# Patient Record
Sex: FEMALE | Race: WHITE | Employment: UNEMPLOYED | URBAN - METROPOLITAN AREA
[De-identification: names, ages, dates, MRNs, and addresses within clinical notes are randomized per-mention and may not be internally consistent; named-entity substitution may affect disease eponyms.]

---

## 2017-01-17 RX ORDER — LEVOTHYROXINE SODIUM 0.1 MG/1
TABLET ORAL
Qty: 30 TABLET | Refills: 2 | Status: SHIPPED | OUTPATIENT
Start: 2017-01-17 | End: 2017-04-10

## 2017-01-17 NOTE — TELEPHONE ENCOUNTER
Pt requests refill on Levothyroxine, protocol passed, refill approved    LOV 12/9/16    LF 10/20/16 #30 w/2    No future appointments.

## 2017-04-10 RX ORDER — LEVOTHYROXINE SODIUM 0.1 MG/1
TABLET ORAL
Qty: 30 TABLET | Refills: 2 | Status: SHIPPED | OUTPATIENT
Start: 2017-04-10 | End: 2017-09-26

## 2017-04-10 NOTE — TELEPHONE ENCOUNTER
Pt requesting refill on Levothyroxine, protocol passed, refill approved    LOV 12/9/16    LF 1/17/17 #30 w/2

## 2017-05-08 ENCOUNTER — OFFICE VISIT (OUTPATIENT)
Dept: FAMILY MEDICINE CLINIC | Facility: CLINIC | Age: 52
End: 2017-05-08

## 2017-05-08 DIAGNOSIS — Z11.1 SCREENING FOR TUBERCULOSIS: Primary | ICD-10-CM

## 2017-05-08 PROCEDURE — 86580 TB INTRADERMAL TEST: CPT | Performed by: FAMILY MEDICINE

## 2017-05-08 NOTE — PROGRESS NOTES
Pt here for first dose of 2-step tb. Discussed 2-step procedure. Pt has no forms to complete, but her boss stated that she should have both doses read after 48-72 hours.   Confirmed that pt is available to come back in 48-72 hours and then again next wee

## 2017-05-08 NOTE — PATIENT INSTRUCTIONS
You will need to return to clinic in 48-72 hours to have results of TB test read. Please return to clinic on 5/10 after 4:45pm or on 5/11 before 4:45pm to have your TB test read.     If you do not return during this time your test will need to be repeat

## 2017-05-11 ENCOUNTER — NURSE ONLY (OUTPATIENT)
Dept: FAMILY MEDICINE CLINIC | Facility: CLINIC | Age: 52
End: 2017-05-11

## 2017-05-15 ENCOUNTER — OFFICE VISIT (OUTPATIENT)
Dept: FAMILY MEDICINE CLINIC | Facility: CLINIC | Age: 52
End: 2017-05-15

## 2017-05-15 DIAGNOSIS — Z11.1 SCREENING FOR TUBERCULOSIS: Primary | ICD-10-CM

## 2017-05-15 PROCEDURE — 86580 TB INTRADERMAL TEST: CPT | Performed by: PHYSICIAN ASSISTANT

## 2017-05-15 NOTE — PROGRESS NOTES
Bertha Alberts 47year old female       [place] 2    · Live vaccines in the past month? no  · Any steroid medication in the past month? no  · History of BCG vaccine? no  · If female, are you currently pregnant?   no    · Are

## 2017-05-15 NOTE — PATIENT INSTRUCTIONS
You will need to return to clinic in 48-72 hours to have results of TB test read. Please return to clinic on 5/17/17 between 10:30 am and 7:30 pm or on 5/18/17 between 8:00 am and 10:30 am to have your TB test read.         TB Screening (Skin)   Does [facility] These tests are to find out if you have active or latent TB. A blood screening test is also available for TB, but only one screening test will be recommended by your healthcare provider, based on your case. What do my test results mean?   Many things may a infection.   You might have a false negative test if you are:  · Malnourished  · On steroid therapy  · Taking medicines that can affect your immune system, such as medicine for AIDS or cancer  How do I get ready for this test?  You don't need to prepare for

## 2017-05-17 ENCOUNTER — OFFICE VISIT (OUTPATIENT)
Dept: FAMILY MEDICINE CLINIC | Facility: CLINIC | Age: 52
End: 2017-05-17

## 2017-05-17 DIAGNOSIS — Z11.1 ENCOUNTER FOR PPD SKIN TEST READING: Primary | ICD-10-CM

## 2017-05-17 NOTE — PROGRESS NOTES
Patient presents for PPD read for 2nd step PPD placement. No problems and feeling well.  0mm induration. Documentation provided to patient.   Follow up PRN

## 2017-09-26 ENCOUNTER — OFFICE VISIT (OUTPATIENT)
Dept: FAMILY MEDICINE CLINIC | Facility: CLINIC | Age: 52
End: 2017-09-26

## 2017-09-26 ENCOUNTER — APPOINTMENT (OUTPATIENT)
Dept: LAB | Age: 52
End: 2017-09-26
Attending: FAMILY MEDICINE
Payer: COMMERCIAL

## 2017-09-26 VITALS
HEART RATE: 77 BPM | DIASTOLIC BLOOD PRESSURE: 70 MMHG | TEMPERATURE: 98 F | OXYGEN SATURATION: 98 % | SYSTOLIC BLOOD PRESSURE: 98 MMHG | BODY MASS INDEX: 32 KG/M2 | RESPIRATION RATE: 18 BRPM | WEIGHT: 167.38 LBS

## 2017-09-26 DIAGNOSIS — E03.9 ACQUIRED HYPOTHYROIDISM: ICD-10-CM

## 2017-09-26 DIAGNOSIS — J45.31 MILD PERSISTENT ASTHMA WITH ACUTE EXACERBATION: ICD-10-CM

## 2017-09-26 DIAGNOSIS — Z12.39 SCREENING FOR BREAST CANCER: ICD-10-CM

## 2017-09-26 DIAGNOSIS — E03.9 ACQUIRED HYPOTHYROIDISM: Primary | ICD-10-CM

## 2017-09-26 DIAGNOSIS — K29.30 CHRONIC SUPERFICIAL GASTRITIS WITHOUT BLEEDING: ICD-10-CM

## 2017-09-26 LAB
FREE T4: 1 NG/DL (ref 0.9–1.8)
TSI SER-ACNC: 2.62 MIU/ML (ref 0.35–5.5)

## 2017-09-26 PROCEDURE — 84443 ASSAY THYROID STIM HORMONE: CPT | Performed by: FAMILY MEDICINE

## 2017-09-26 PROCEDURE — 84439 ASSAY OF FREE THYROXINE: CPT | Performed by: FAMILY MEDICINE

## 2017-09-26 PROCEDURE — 90686 IIV4 VACC NO PRSV 0.5 ML IM: CPT | Performed by: FAMILY MEDICINE

## 2017-09-26 PROCEDURE — 90471 IMMUNIZATION ADMIN: CPT | Performed by: FAMILY MEDICINE

## 2017-09-26 PROCEDURE — 36415 COLL VENOUS BLD VENIPUNCTURE: CPT | Performed by: FAMILY MEDICINE

## 2017-09-26 PROCEDURE — 99214 OFFICE O/P EST MOD 30 MIN: CPT | Performed by: FAMILY MEDICINE

## 2017-09-26 RX ORDER — OMEPRAZOLE 40 MG/1
40 CAPSULE, DELAYED RELEASE ORAL
Qty: 90 CAPSULE | Refills: 3 | Status: SHIPPED | OUTPATIENT
Start: 2017-09-26 | End: 2018-10-11

## 2017-09-26 RX ORDER — PREDNISONE 20 MG/1
TABLET ORAL
Qty: 30 TABLET | Refills: 0 | Status: SHIPPED | OUTPATIENT
Start: 2017-09-26 | End: 2018-02-13 | Stop reason: ALTCHOICE

## 2017-09-26 RX ORDER — ALBUTEROL SULFATE 90 UG/1
2 AEROSOL, METERED RESPIRATORY (INHALATION) EVERY 4 HOURS PRN
Qty: 1 INHALER | Refills: 1 | Status: SHIPPED | OUTPATIENT
Start: 2017-09-26 | End: 2018-06-14

## 2017-09-26 RX ORDER — LEVOTHYROXINE SODIUM 0.1 MG/1
TABLET ORAL
Qty: 30 TABLET | Refills: 12 | Status: SHIPPED | OUTPATIENT
Start: 2017-09-26 | End: 2018-10-05

## 2017-09-26 RX ORDER — MONTELUKAST SODIUM 10 MG/1
10 TABLET ORAL NIGHTLY
Qty: 90 TABLET | Refills: 4 | Status: SHIPPED | OUTPATIENT
Start: 2017-09-26 | End: 2018-11-29

## 2017-09-26 NOTE — PATIENT INSTRUCTIONS
Start taper tomorrow after finish initial prednisone dose start  prednisone 20 mg 2 tablets daily x 5days then taper as directed  Do not take any Ibuprofen, Advil, Motrin, Naproxen, Aspirin while on Prednisone or medrol dose pack.   Tylenol(Acetaminophen) i The air you inhale contains oxygen. When oxygen reaches the air sacs, it passes into the blood vessels surrounding the sacs. Your blood then delivers oxygen to all of your cells. Carbon dioxide is removed from the body in a similar way, as you exhale.   Whe Call 911, or have someone call for you, if you have any of these symptoms and they are not relieved right away by taking your quick-relief medication as prescribed:  · Severe difficulty breathing  · Being too short of breath to talk or walk  · Lips or fing · Make sure you check the package insert for special instructions. The insert is the information that comes with the medicine. It may tell you how to take care of and clean your spacer.   When to replace your inhaler  Each inhaler is good for only a certain

## 2017-09-27 PROBLEM — J45.30 MILD PERSISTENT ASTHMA WITHOUT COMPLICATION (HCC): Status: ACTIVE | Noted: 2017-09-27

## 2017-09-27 PROBLEM — J45.30 MILD PERSISTENT ASTHMA WITHOUT COMPLICATION: Status: ACTIVE | Noted: 2017-09-27

## 2017-09-27 NOTE — PROGRESS NOTES
CHIEF COMPLAINT: Patient presents with:  Medication Request        HPI:     Cayla Espinoza is a 47year old female presents for evaluation of asthma flare. States 3-4 weeks ago started having nasal congestion runny nose and taking allergy meds.   Symptoms Smokeless tobacco: Never Used                      Alcohol use: Yes           0.0 oz/week     Comment: social       Medications (Active prior to today's visit):    Current Outpatient Prescriptions:  O wnl maria t. NO teeth clenching. bluish nasal turbinates b/l. Nasal congestion. Sinuses nontender. NECK: supple, no lymphadenopathy, no thyroid nodule, smooth gland, no thyroid enlargement. LUNGS: CTA maria t. Rales rhonchi wheezing or wheezes.   Good inspirator Dispense: 90 tablet; Refill: 4    3. Acquired hypothyroidism  Euthymic. Taking same dose for several years. - Levothyroxine Sodium 100 MCG Oral Tab; TAKE ONE TABLET BY MOUTH EVERY DAY BEFORE BREAKFAST  Dispense: 30 tablet;  Refill: 12  - TSH+FREE T4; Futu result of today.      Problem List:   Patient Active Problem List:     Asthma     GERD (gastroesophageal reflux disease)     Family history of esophageal cancer     Family history of colon cancer     Hypothyroidism     Allergic rhinitis     Hypothyroid

## 2017-09-29 RX ORDER — AZELASTINE HYDROCHLORIDE 0.5 MG/ML
1 SOLUTION/ DROPS OPHTHALMIC 2 TIMES DAILY
Qty: 6 ML | Refills: 6 | Status: SHIPPED | OUTPATIENT
Start: 2017-09-29 | End: 2019-10-16

## 2017-09-29 NOTE — TELEPHONE ENCOUNTER
Reviewed normal lab results with patient, patient states at [address] it was discussed that she was to start eye drops for her allergies, she had mentioned the pharmacist gave our office some options?  Please advise on what you'd like to prescribe or I can call ph

## 2017-09-29 NOTE — TELEPHONE ENCOUNTER
Pharmacist states you can try a generic azelastine. Most eye drops are not covered by insurance. Pended azelastine 0.05% eye drop which has generic options.

## 2017-10-17 ENCOUNTER — OFFICE VISIT (OUTPATIENT)
Dept: FAMILY MEDICINE CLINIC | Facility: CLINIC | Age: 52
End: 2017-10-17

## 2017-10-17 VITALS
HEART RATE: 68 BPM | RESPIRATION RATE: 18 BRPM | DIASTOLIC BLOOD PRESSURE: 70 MMHG | OXYGEN SATURATION: 99 % | TEMPERATURE: 99 F | SYSTOLIC BLOOD PRESSURE: 102 MMHG | BODY MASS INDEX: 31 KG/M2 | WEIGHT: 166.63 LBS

## 2017-10-17 DIAGNOSIS — J30.2 CHRONIC SEASONAL ALLERGIC RHINITIS DUE TO OTHER ALLERGEN: ICD-10-CM

## 2017-10-17 DIAGNOSIS — J45.30 MILD PERSISTENT ASTHMA WITHOUT COMPLICATION: Primary | ICD-10-CM

## 2017-10-17 PROCEDURE — 99213 OFFICE O/P EST LOW 20 MIN: CPT | Performed by: FAMILY MEDICINE

## 2017-10-17 NOTE — PROGRESS NOTES
CHIEF COMPLAINT: Patient presents with: Follow - Up: asthma        HPI:     Izabela Steen is a 47year old female presents for follow-up asthma evaluation. On last week of prednisone steroid taper and denies any symptoms.   Denies cough, wheezing, chest Prescriptions:  Azelastine HCl 0.05 % Ophthalmic Solution Place 1 drop into both eyes 2 (two) times daily. Disp: 6 mL Rfl: 6   Omeprazole 40 MG Oral Capsule Delayed Release Take 1 capsule (40 mg total) by mouth once daily.  Disp: 90 capsule Rfl: 3   Levothy supple, no lymphadenopathy, no thyroid nodule, smooth gland, no thyroid enlargement. LUNGS: CTA maria t. Without Rales rhonchi wheezing or wheezes. Good inspiratory expiratory asked effort. HEART:S1/S2 reg., no murmurs, clicks, gallops.   ABD: soft, BS pres This Visit:    No prescriptions requested or ordered in this encounter    Health Maintenance:  Annual Depression Screen due on 06/21/2017  Annual Physical due on 06/21/2017  Mammogram,1 Yr due on 11/04/2017  Asthma Action Plan due on 12/09/2017  Asthma Con

## 2018-02-13 ENCOUNTER — OFFICE VISIT (OUTPATIENT)
Dept: FAMILY MEDICINE CLINIC | Facility: CLINIC | Age: 53
End: 2018-02-13

## 2018-02-13 VITALS
RESPIRATION RATE: 18 BRPM | SYSTOLIC BLOOD PRESSURE: 110 MMHG | OXYGEN SATURATION: 99 % | WEIGHT: 166.81 LBS | BODY MASS INDEX: 32 KG/M2 | HEART RATE: 71 BPM | DIASTOLIC BLOOD PRESSURE: 70 MMHG | TEMPERATURE: 98 F

## 2018-02-13 DIAGNOSIS — J45.30 MILD PERSISTENT ASTHMA WITHOUT COMPLICATION: ICD-10-CM

## 2018-02-13 DIAGNOSIS — R19.8 ABDOMINAL COMPLAINTS: Primary | ICD-10-CM

## 2018-02-13 PROCEDURE — 99214 OFFICE O/P EST MOD 30 MIN: CPT | Performed by: FAMILY MEDICINE

## 2018-02-13 RX ORDER — MAGNESIUM OXIDE 400 MG (241.3 MG MAGNESIUM) TABLET
3 TABLET NIGHTLY
COMMUNITY

## 2018-02-13 NOTE — PATIENT INSTRUCTIONS
Unknown Causes of Abdominal - movement (Female)    The exact cause of your abdominal (stomach) pain is not clear. This does not mean that this is something to worry about.  Everyone likes to know the exact cause of the problem, but sometimes with abdomina · Water is important so you do not get dehydrated. Soup may also be good. Sports drinks may also help, especially if they are not too acidic. Make sure you don't drink sugary drinks as this can make things worse. Take liquids in small amounts.  Do not guzzl © 4426-0403 [address] [facility] [place] [place]. All rights reserved. This information is not intended as a substitute for professional medical care. Always follow your healthcare professional's instructions.         Unknown · Do not force yourself to eat, especially if having cramps, vomiting, or diarrhea. · Water is important so you do not get dehydrated. Soup may also be good. Sports drinks may also help, especially if they are not too acidic.  Make sure you don't drink sug © 1149-1975 [address] [facility] [place], [facility] [place]. All rights reserved. This information is not intended as a substitute for professional medical care. Always follow your healthcare professional's instructions.

## 2018-02-14 NOTE — PROGRESS NOTES
CHIEF COMPLAINT: Patient presents with:  Pain: pressure L- lower rib cage; x1 week    HPI:     Rob Bach is a 47year old female presents for left upper quadrant left lower rib pressure sensation and movement with in her intestine ×2-3 weeks.   Happen Dreyer/ ronaldo  4/18/16: EGD      Comment: Dreyer/ronaldo  4-2015: DUSTIN NEEDLE LOCALIZATION W/ SPECIMEN 1 SITE LEFT Left      Comment: BENIGN  2007: REDUCTION LEFT      Comment: BREAST LIFT  2007: REDUCTION RIGHT Right      Comment: BREAST LIFT   Famil from today and agreed except as otherwise stated in HPI.  ROS:     Review of Systems  Positive for stated complaint: Left upper abdominal pressure, denies abdominal pain, vomiting appetite changes fever     Pertinent positives and negatives noted in the th sooner if symptoms change or worsen i.e. fever, vomiting etc.    2. Mild persistent asthma without complication  ACT greater than 19  AAP given and reviewed  Continue to [facility], Albuterol As Needed, montelukast    Meds This Visit:    No prescriptions reque

## 2018-03-17 ENCOUNTER — OFFICE VISIT (OUTPATIENT)
Dept: FAMILY MEDICINE CLINIC | Facility: CLINIC | Age: 53
End: 2018-03-17

## 2018-03-17 VITALS
TEMPERATURE: 99 F | RESPIRATION RATE: 20 BRPM | OXYGEN SATURATION: 98 % | DIASTOLIC BLOOD PRESSURE: 74 MMHG | SYSTOLIC BLOOD PRESSURE: 118 MMHG | HEART RATE: 102 BPM

## 2018-03-17 DIAGNOSIS — J11.1 INFLUENZA-LIKE ILLNESS: Primary | ICD-10-CM

## 2018-03-17 PROCEDURE — 99213 OFFICE O/P EST LOW 20 MIN: CPT | Performed by: NURSE PRACTITIONER

## 2018-03-17 RX ORDER — OSELTAMIVIR PHOSPHATE 75 MG/1
75 CAPSULE ORAL 2 TIMES DAILY
Qty: 10 CAPSULE | Refills: 0 | Status: SHIPPED | OUTPATIENT
Start: 2018-03-17 | End: 2018-03-22

## 2018-03-17 NOTE — PROGRESS NOTES
CHIEF COMPLAINT:   Patient presents with:  URI      HPI:   Criselda Wood is a 46year old female who presents for sudden onset fever, body aches, chills and upper respiratory symptoms for  1 days. TMAX of 101.  Patient reports sore throat, congestion, dry Last exacerbation 3 weeks ago - triggered by cold, allergy.    • Hyperplastic polyp of stomach 4/20/16   • Hypothyroid    • Tubular adenoma of rectum 4/20/16      Past Surgical History:  4/18/16: COLONOSCOPY & POLYPECTOMY      Comment: Dreyer/ prescence  4 ASSESSMENT: Influenza-like illness  (primary encounter diagnosis)    PLAN:  Meds as below. Dicussed risk vs. Benefit of tamiflu, would like to take debora with asthma. Advised use inhaler q4 as needed. Comfort care as described in Patient Instructions.   Pl · Nausea and loss of appetite are common with the flu. Eat light meals. Drink 6 to 8 glasses of liquids every day. Good choices are water, sport drinks, soft drinks without caffeine, juices, tea, and soup.  Extra fluids will also help loosen secretions in y

## 2018-06-14 DIAGNOSIS — J45.31 MILD PERSISTENT ASTHMA WITH ACUTE EXACERBATION: ICD-10-CM

## 2018-06-14 NOTE — TELEPHONE ENCOUNTER
Pt. requested refill Ventolin, protocol passed. Refill approved. LOV 2/13/2018  LF 9/26/17 1, 1 refill    No future appointments.

## 2018-06-18 ENCOUNTER — OFFICE VISIT (OUTPATIENT)
Dept: FAMILY MEDICINE CLINIC | Facility: CLINIC | Age: 53
End: 2018-06-18

## 2018-06-18 ENCOUNTER — HOSPITAL ENCOUNTER (OUTPATIENT)
Dept: MAMMOGRAPHY | Facility: HOSPITAL | Age: 53
Discharge: HOME | End: 2018-06-18
Attending: FAMILY MEDICINE
Payer: COMMERCIAL

## 2018-06-18 VITALS
OXYGEN SATURATION: 98 % | BODY MASS INDEX: 30.77 KG/M2 | DIASTOLIC BLOOD PRESSURE: 76 MMHG | WEIGHT: 167.19 LBS | RESPIRATION RATE: 16 BRPM | HEART RATE: 61 BPM | SYSTOLIC BLOOD PRESSURE: 114 MMHG | TEMPERATURE: 98 F | HEIGHT: 62 IN

## 2018-06-18 DIAGNOSIS — Z87.2 HISTORY OF BREAST ABSCESS: ICD-10-CM

## 2018-06-18 DIAGNOSIS — L53.9 REDNESS OF SKIN: ICD-10-CM

## 2018-06-18 DIAGNOSIS — N63.10 BREAST MASS, RIGHT: ICD-10-CM

## 2018-06-18 DIAGNOSIS — N61.0 ACUTE MASTITIS OF RIGHT BREAST: ICD-10-CM

## 2018-06-18 DIAGNOSIS — N61.0 ACUTE MASTITIS OF RIGHT BREAST: Primary | ICD-10-CM

## 2018-06-18 PROCEDURE — 77066 DX MAMMO INCL CAD BI: CPT | Performed by: FAMILY MEDICINE

## 2018-06-18 PROCEDURE — 77062 BREAST TOMOSYNTHESIS BI: CPT | Performed by: FAMILY MEDICINE

## 2018-06-18 PROCEDURE — 76642 ULTRASOUND BREAST LIMITED: CPT | Performed by: FAMILY MEDICINE

## 2018-06-18 PROCEDURE — 99214 OFFICE O/P EST MOD 30 MIN: CPT | Performed by: FAMILY MEDICINE

## 2018-06-18 RX ORDER — AMOXICILLIN AND CLAVULANATE POTASSIUM 500; 125 MG/1; MG/1
500 TABLET, FILM COATED ORAL 2 TIMES DAILY
Refills: 0 | COMMUNITY
Start: 2018-06-16 | End: 2018-06-28 | Stop reason: ALTCHOICE

## 2018-06-18 RX ORDER — KETOCONAZOLE 20 MG/G
1 CREAM TOPICAL 2 TIMES DAILY
Qty: 60 G | Refills: 0 | Status: SHIPPED | OUTPATIENT
Start: 2018-06-18 | End: 2018-07-09

## 2018-06-18 NOTE — PATIENT INSTRUCTIONS
Your stat diagnostic mammogram appointment as at 2 PM today at the [facility] scheduling- call to schedule diagnostic tests, labs, imaging, physical therapy. Follow prompts.    [phone]    Candida Skin Infection (Adult)  Cand · If you are overweight, talk with your healthcare provider about a plan to lose excess weight. · Avoid clothes that fit tightly. Follow-up care  Follow up with your healthcare provider, or as advised. Your rash will clear in 7 to 14 days.  Call your heal ¨ Apply a warm compress (towel soaked in warm water) to the breast. You may also use a hot water bottle. · Check your breasts each day. Keep a log of whether the lump seems to be changing in size or tenderness with your period.  This can help your healthca

## 2018-06-18 NOTE — PROGRESS NOTES
CHIEF COMPLAINT: Patient presents with:  Breast Pain: right, for 4 days, redness    HPI:     Leroy Somers is a 46year old female presents for right breast mass ×4 days noticed redness over her 10year-old incision from a breast lift and was having pain of Onset   • Gastro-Intestinal Disorder Father      esophageal strictures   • Other [OTHER] Father      COPD   • Hypertension Mother    • MI [OTHER] Brother 52   • Cancer Brother 40     esophageal cancer      Social History: Smoking status: Never Smoker in the the HPI. PHYSICAL EXAM:   /76 (BP Location: Right arm, Patient Position: Sitting, Cuff Size: adult)   Pulse 61   Temp 98.4 °F (36.9 °C) (Oral)   Resp 16   Ht 62\"   Wt 167 lb 3.2 oz   SpO2 98%   BMI 30.58 kg/m²   Vital signs reviewed. Appear abscess, will follow-up with me in 1 week and will have future consultation with Dr. Kai Becker  - DUSTIN ELDERO 2D+3D DIAGNOSTIC [facility]  GENOVEVA (CPT=77066/51646); Future  - ketoconazole 2 % External Cream; Apply 1 Application topically 2 (two) times daily.   Dispense: 6

## 2018-06-19 ENCOUNTER — TELEPHONE (OUTPATIENT)
Dept: FAMILY MEDICINE CLINIC | Facility: CLINIC | Age: 53
End: 2018-06-19

## 2018-06-19 NOTE — TELEPHONE ENCOUNTER
Spoke with pt, reviewed results and recommendations, pt verbalized understanding    Notes recorded by Booker Greenberg DO on 6/18/2018 at 10:24 PM CDT  1 month follow-up ultrasound ordered for monitoring.  Patient spoke with radiologist.  And decided to fin

## 2018-06-28 ENCOUNTER — TELEPHONE (OUTPATIENT)
Dept: FAMILY MEDICINE CLINIC | Facility: CLINIC | Age: 53
End: 2018-06-28

## 2018-06-28 ENCOUNTER — OFFICE VISIT (OUTPATIENT)
Dept: FAMILY MEDICINE CLINIC | Facility: CLINIC | Age: 53
End: 2018-06-28

## 2018-06-28 VITALS
SYSTOLIC BLOOD PRESSURE: 110 MMHG | HEART RATE: 69 BPM | DIASTOLIC BLOOD PRESSURE: 68 MMHG | RESPIRATION RATE: 18 BRPM | TEMPERATURE: 98 F | OXYGEN SATURATION: 98 % | BODY MASS INDEX: 30 KG/M2 | WEIGHT: 161.81 LBS

## 2018-06-28 DIAGNOSIS — N63.10 BREAST MASS, RIGHT: ICD-10-CM

## 2018-06-28 DIAGNOSIS — Z87.2 HISTORY OF BREAST ABSCESS: Primary | ICD-10-CM

## 2018-06-28 DIAGNOSIS — J45.31 MILD PERSISTENT ASTHMA WITH ACUTE EXACERBATION: ICD-10-CM

## 2018-06-28 PROCEDURE — 99212 OFFICE O/P EST SF 10 MIN: CPT | Performed by: FAMILY MEDICINE

## 2018-06-28 NOTE — TELEPHONE ENCOUNTER
Patient called stating she got a prescription for Dulera at [facility], but that [facility] does not accept her discount coupon. Requesting if she can get the Rx sent to the [facility] in [employer] on [address] in [facility].  Informed pt the Rx was sent to this pharmac

## 2018-06-28 NOTE — PROGRESS NOTES
CHIEF COMPLAINT: Patient presents with: Follow - Up: recheck    HPI:     Ashley Blanco is a 46year old female presents for follow-up breast tenderness and mass with history of mastitis. Breast mass resolved.   Symptoms started about 2 weeks ago had ult allergy.    • Hyperplastic polyp of stomach 4/20/16   • Hypothyroid    • Tubular adenoma of rectum 4/20/16      Past Surgical History:  4/18/16: COLONOSCOPY & POLYPECTOMY      Comment: Dreyer/ prescence  4/18/16: EGD      Comment: Dreyer/prescence  9-7268: ketoconazole 2 % External Cream Apply 1 Application topically 2 (two) times daily.  Disp: 60 g Rfl: 0       Allergies:    Clindamycin             RASH, ITCHING  Dander                      Comment:HORSE  Dust                        PSFH elements reviewed BREAST.  One-month follow-up ultrasound        Dictated by: Marjorie Guerrero MD on 6/18/2018 at 15:10       Approved by: Marjorie Guerrero MD             Addended by Michelle Coles MD on 6/18/2018  3:10 PM      Study Result     PROCEDURE:  [facility] JAZMYNE 2D+3D DIAG the opinion is that this represents fat necrosis. She is currently on antibiotics. We have agreed to have her finish her course of   antibiotics and to repeat the ultrasound in 1 month.   This course of action is her preference.     =====  CONCLUSION: esophageal cancer     Family history of colon cancer     Hypothyroidism     Allergic rhinitis     Hypothyroid     Rash     Chronic superficial gastritis without bleeding     Mild persistent asthma without complication     Acute mastitis of right breast

## 2018-08-22 ENCOUNTER — HOSPITAL ENCOUNTER (OUTPATIENT)
Dept: MAMMOGRAPHY | Facility: HOSPITAL | Age: 53
Discharge: HOME | End: 2018-08-22
Attending: FAMILY MEDICINE
Payer: COMMERCIAL

## 2018-08-22 DIAGNOSIS — N63.10 BREAST MASS, RIGHT: ICD-10-CM

## 2018-08-22 DIAGNOSIS — Z87.2 HISTORY OF BREAST ABSCESS: ICD-10-CM

## 2018-08-22 DIAGNOSIS — L53.9 REDNESS OF SKIN: ICD-10-CM

## 2018-08-22 DIAGNOSIS — N61.0 ACUTE MASTITIS OF RIGHT BREAST: ICD-10-CM

## 2018-08-22 PROCEDURE — 76642 ULTRASOUND BREAST LIMITED: CPT | Performed by: FAMILY MEDICINE

## 2018-08-24 ENCOUNTER — TELEPHONE (OUTPATIENT)
Dept: FAMILY MEDICINE CLINIC | Facility: CLINIC | Age: 53
End: 2018-08-24

## 2018-08-24 NOTE — TELEPHONE ENCOUNTER
Spoke with pt, reviewed results and recommendations, pt verbalized understanding    Notes recorded by Liss Paredes DO on 8/22/2018 at 10:51 PM CDT  Results reviewed.  Released to [facility] of concern is smaller and recommended return to annual routi

## 2018-09-03 DIAGNOSIS — J45.31 MILD PERSISTENT ASTHMA WITH ACUTE EXACERBATION: ICD-10-CM

## 2018-09-04 NOTE — TELEPHONE ENCOUNTER
Pt requesting refill on Ventolin, protocol passed, refill approved    LOV 6/28/18    LF 6/14/18    No future appointments.

## 2018-10-05 DIAGNOSIS — E03.9 ACQUIRED HYPOTHYROIDISM: ICD-10-CM

## 2018-10-05 RX ORDER — LEVOTHYROXINE SODIUM 0.1 MG/1
TABLET ORAL
Qty: 30 TABLET | Refills: 11 | Status: SHIPPED | OUTPATIENT
Start: 2018-10-05 | End: 2018-10-11

## 2018-10-05 NOTE — TELEPHONE ENCOUNTER
Pt requesting a refill of levothyroxine, protocol failed. Unable to approve.     LOV 6/28/18  LF 9/26/17    Future Appointments   Date Time Provider Lorri Joseph   10/11/2018  9:00 AM Bryan March, DO [facility] [place]

## 2018-10-11 ENCOUNTER — OFFICE VISIT (OUTPATIENT)
Dept: FAMILY MEDICINE CLINIC | Facility: CLINIC | Age: 53
End: 2018-10-11
Payer: COMMERCIAL

## 2018-10-11 VITALS
DIASTOLIC BLOOD PRESSURE: 76 MMHG | RESPIRATION RATE: 18 BRPM | SYSTOLIC BLOOD PRESSURE: 118 MMHG | OXYGEN SATURATION: 98 % | HEART RATE: 65 BPM | BODY MASS INDEX: 30 KG/M2 | WEIGHT: 164 LBS | TEMPERATURE: 98 F

## 2018-10-11 DIAGNOSIS — E03.9 ACQUIRED HYPOTHYROIDISM: ICD-10-CM

## 2018-10-11 DIAGNOSIS — Z23 NEED FOR VACCINATION: ICD-10-CM

## 2018-10-11 DIAGNOSIS — K29.30 CHRONIC SUPERFICIAL GASTRITIS WITHOUT BLEEDING: ICD-10-CM

## 2018-10-11 PROCEDURE — 84443 ASSAY THYROID STIM HORMONE: CPT | Performed by: FAMILY MEDICINE

## 2018-10-11 PROCEDURE — 90686 IIV4 VACC NO PRSV 0.5 ML IM: CPT | Performed by: FAMILY MEDICINE

## 2018-10-11 PROCEDURE — 99213 OFFICE O/P EST LOW 20 MIN: CPT | Performed by: FAMILY MEDICINE

## 2018-10-11 PROCEDURE — 36415 COLL VENOUS BLD VENIPUNCTURE: CPT | Performed by: FAMILY MEDICINE

## 2018-10-11 PROCEDURE — 90471 IMMUNIZATION ADMIN: CPT | Performed by: FAMILY MEDICINE

## 2018-10-11 PROCEDURE — 84439 ASSAY OF FREE THYROXINE: CPT | Performed by: FAMILY MEDICINE

## 2018-10-11 RX ORDER — OMEPRAZOLE 40 MG/1
40 CAPSULE, DELAYED RELEASE ORAL
Qty: 90 CAPSULE | Refills: 3 | Status: SHIPPED | OUTPATIENT
Start: 2018-10-11 | End: 2018-10-11

## 2018-10-11 RX ORDER — LEVOTHYROXINE SODIUM 0.1 MG/1
TABLET ORAL
Qty: 30 TABLET | Refills: 11 | Status: SHIPPED | OUTPATIENT
Start: 2018-10-11 | End: 2019-10-26

## 2018-10-11 NOTE — PATIENT INSTRUCTIONS
Manage your gastritis/ Heartburn:    · Avoid NSAIDs: ibuprofen, naproxen, advil, aleve. Ask your doctor about celebrex if you need antiinflammatory. They can irritate your stomach.  It may help to take them with food, but you may not be able to ta After you eat, food travels from your mouth down the esophagus to your stomach. Along the way, food passes through a one-way valve called the lower esophageal sphincter (LES), the opening to your stomach. Normally the LES opens when you swallow.  It allows This condition is called GERD (gastroesophageal reflux disease) or acid reflux.  When stomach acid flows upward into the esophagus, it causes burning, pressure or sharp pain in the upper abdomen or mid to lower chest. The pain can spread to the neck, back, · Limit or avoid fatty, fried, and spicy foods, as well as coffee, chocolate, mint, and foods with high acid content such as tomatoes and citrus fruit and juices (orange, grapefruit, lemon). · Avoid alcohol and smoking.   · Don’t eat large meals, especiall A medical evaluation will be done to find out the cause of your symptoms. The evaluation may include your health history, a physical exam, and some tests. Once your evaluation is done, treatment can begin.  It may include taking certain medicines and making When you have GERD, stomach acid feels as if it’s backing up toward your mouth. Whether or not you take medicine to control your GERD, your symptoms can often be improved with lifestyle changes.  Talk to your healthcare provider about the following suggesti

## 2018-10-11 NOTE — PROGRESS NOTES
CHIEF COMPLAINT: Patient presents with:  Medication Request: omeprazole      HPI:     Juline Bamberger is a 46year old female presents for recheck of thyroid and discuss heartburn history of gastritis.     History of gastritis with hospital admission but de Gastro-Intestinal Disorder Father         esophageal strictures   • Other (Other) Father         COPD   • Hypertension Mother    • Other (MI) Brother 52   • Cancer Brother 40        esophageal cancer      Social History: Social History    Tobacco Use Temp 97.9 °F (36.6 °C) (Oral)   Resp 18   Wt 164 lb   SpO2 98%   BMI 30.00 kg/m²   Vital signs reviewed. Appears stated age, well groomed. Physical Exam  GEN: NAD, A,O x3, pleasant. No tachypnea. Appears comfortable. Nontoxic.   HEENT: JASVIR, EOM-i, no Prescriptions     Pending Prescriptions Disp Refills   • Omeprazole 40 MG Oral Capsule Delayed Release 90 capsule 3     Sig: Take 1 capsule (40 mg total) by mouth once daily.    • Levothyroxine Sodium 100 MCG Oral Tab 30 tablet 11     Sig: TAKE ONE TABLET B

## 2018-10-15 ENCOUNTER — TELEPHONE (OUTPATIENT)
Dept: FAMILY MEDICINE CLINIC | Facility: CLINIC | Age: 53
End: 2018-10-15

## 2018-10-15 NOTE — TELEPHONE ENCOUNTER
LMOM to return call to the office. Provided pt office phone [phone] along with office hours given. at 10/11/2018  8:48 PM CDT -----  Results reviewed. Released to [address] Tests show no significant abnormalities.

## 2018-11-23 ENCOUNTER — IMAGING SERVICES (OUTPATIENT)
Dept: OTHER | Age: 53
End: 2018-11-23

## 2018-11-29 DIAGNOSIS — J45.31 MILD PERSISTENT ASTHMA WITH ACUTE EXACERBATION: ICD-10-CM

## 2018-11-29 RX ORDER — MONTELUKAST SODIUM 10 MG/1
TABLET ORAL
Qty: 90 TABLET | Refills: 0 | Status: SHIPPED | OUTPATIENT
Start: 2018-11-29 | End: 2019-01-29

## 2018-11-29 NOTE — TELEPHONE ENCOUNTER
Pt requesting refill on Montelukast, protocol passed, refill approved    LOV 10/11/18    LF 9/26/17    No future appointments.

## 2018-12-05 ENCOUNTER — OFFICE VISIT (OUTPATIENT)
Dept: FAMILY MEDICINE CLINIC | Facility: CLINIC | Age: 53
End: 2018-12-05
Payer: COMMERCIAL

## 2018-12-05 VITALS
DIASTOLIC BLOOD PRESSURE: 68 MMHG | RESPIRATION RATE: 16 BRPM | HEART RATE: 68 BPM | OXYGEN SATURATION: 99 % | SYSTOLIC BLOOD PRESSURE: 124 MMHG | TEMPERATURE: 100 F

## 2018-12-05 DIAGNOSIS — B34.9 ACUTE VIRAL SYNDROME: Primary | ICD-10-CM

## 2018-12-05 PROCEDURE — 99214 OFFICE O/P EST MOD 30 MIN: CPT | Performed by: FAMILY MEDICINE

## 2018-12-05 RX ORDER — OSELTAMIVIR PHOSPHATE 75 MG/1
75 CAPSULE ORAL 2 TIMES DAILY
Qty: 10 CAPSULE | Refills: 0 | Status: SHIPPED | OUTPATIENT
Start: 2018-12-05 | End: 2018-12-10

## 2018-12-05 NOTE — PROGRESS NOTES
CHIEF COMPLAINT: Patient presents with:  Cough  Fever: 101  Nasal Congestion        HPI:     Roney Del Toro is a 46year old female presents for bodyaches and fever. Pt is p/w a 1 day h/o of fever.  She started with bodyaches and chills yesterday and ha Levothyroxine Sodium 100 MCG Oral Tab TAKE ONE TABLET BY MOUTH ONE TIME DAILY BEFORE BREAKFAST Disp: 30 tablet Rfl: 11   Lansoprazole 15 MG Oral Tablet Dispersible Take 1 tablet (15 mg total) by mouth daily.  Disp: 90 tablet Rfl: 3   Mometasone Furo-Formo Tympanic membrane and ear canal normal. Tympanic membrane is not erythematous. Eyes: Conjunctivae are normal.   Neck: Normal range of motion. Neck supple. Cardiovascular: Normal rate, regular rhythm and normal heart sounds.     Pulmonary/Chest: Effort n persistent asthma without complication     Acute mastitis of right breast     History of breast abscess     Breast mass, right     Redness of skin      Imaging & Referrals:  None     12/5/2018  Kassandra Motta MD      Patient understands plan and f

## 2019-01-20 ENCOUNTER — PATIENT OUTREACH (OUTPATIENT)
Dept: FAMILY MEDICINE CLINIC | Facility: CLINIC | Age: 54
End: 2019-01-20

## 2019-01-25 DIAGNOSIS — J45.31 MILD PERSISTENT ASTHMA WITH ACUTE EXACERBATION: ICD-10-CM

## 2019-01-25 RX ORDER — ALBUTEROL SULFATE 90 UG/1
AEROSOL, METERED RESPIRATORY (INHALATION)
Qty: 18 G | Refills: 0 | OUTPATIENT
Start: 2019-01-25

## 2019-01-25 NOTE — TELEPHONE ENCOUNTER
Pt requesting a refill of ventolin and dulera inhalers, protocols passed. Refills approved.     LOV with PCP 12/5/18    LF dulera 6/28/18 #1 with 6 inhaler  LF ventolin 6/14/18 #18g    Future Appointments   Date Time Provider Lorri Joseph   1/29/2019 1

## 2019-01-29 ENCOUNTER — OFFICE VISIT (OUTPATIENT)
Dept: FAMILY MEDICINE CLINIC | Facility: CLINIC | Age: 54
End: 2019-01-29
Payer: COMMERCIAL

## 2019-01-29 VITALS
HEART RATE: 63 BPM | WEIGHT: 164 LBS | BODY MASS INDEX: 29.43 KG/M2 | TEMPERATURE: 98 F | SYSTOLIC BLOOD PRESSURE: 112 MMHG | HEIGHT: 62.5 IN | DIASTOLIC BLOOD PRESSURE: 62 MMHG

## 2019-01-29 DIAGNOSIS — J45.31 MILD PERSISTENT ASTHMA WITH ACUTE EXACERBATION: ICD-10-CM

## 2019-01-29 DIAGNOSIS — Z23 NEED FOR VACCINATION: ICD-10-CM

## 2019-01-29 DIAGNOSIS — Z13.0 SCREENING FOR IRON DEFICIENCY ANEMIA: ICD-10-CM

## 2019-01-29 DIAGNOSIS — Z12.11 SCREENING FOR COLON CANCER: ICD-10-CM

## 2019-01-29 DIAGNOSIS — Z00.00 ANNUAL PHYSICAL EXAM: Primary | ICD-10-CM

## 2019-01-29 DIAGNOSIS — E03.9 ACQUIRED HYPOTHYROIDISM: ICD-10-CM

## 2019-01-29 DIAGNOSIS — D12.8 TUBULAR ADENOMA OF RECTUM: ICD-10-CM

## 2019-01-29 DIAGNOSIS — Z13.6 SCREENING FOR HEART DISEASE: ICD-10-CM

## 2019-01-29 DIAGNOSIS — Z13.228 SCREENING FOR ENDOCRINE, NUTRITIONAL, METABOLIC AND IMMUNITY DISORDER: ICD-10-CM

## 2019-01-29 DIAGNOSIS — Z12.39 SCREENING FOR MALIGNANT NEOPLASM OF BREAST: ICD-10-CM

## 2019-01-29 DIAGNOSIS — Z13.29 SCREENING FOR ENDOCRINE, NUTRITIONAL, METABOLIC AND IMMUNITY DISORDER: ICD-10-CM

## 2019-01-29 DIAGNOSIS — Z13.0 SCREENING FOR ENDOCRINE, NUTRITIONAL, METABOLIC AND IMMUNITY DISORDER: ICD-10-CM

## 2019-01-29 DIAGNOSIS — Z13.21 SCREENING FOR ENDOCRINE, NUTRITIONAL, METABOLIC AND IMMUNITY DISORDER: ICD-10-CM

## 2019-01-29 DIAGNOSIS — Z12.4 PAP SMEAR FOR CERVICAL CANCER SCREENING: ICD-10-CM

## 2019-01-29 PROBLEM — Z98.890 H/O BREAST SURGERY: Status: ACTIVE | Noted: 2019-01-29

## 2019-01-29 PROBLEM — C44.310 BASAL CELL CARCINOMA (BCC) OF SKIN OF FACE: Status: ACTIVE | Noted: 2019-01-29

## 2019-01-29 PROCEDURE — 90471 IMMUNIZATION ADMIN: CPT | Performed by: FAMILY MEDICINE

## 2019-01-29 PROCEDURE — 90732 PPSV23 VACC 2 YRS+ SUBQ/IM: CPT | Performed by: FAMILY MEDICINE

## 2019-01-29 PROCEDURE — 99396 PREV VISIT EST AGE 40-64: CPT | Performed by: FAMILY MEDICINE

## 2019-01-29 RX ORDER — ALBUTEROL SULFATE 90 UG/1
AEROSOL, METERED RESPIRATORY (INHALATION)
Qty: 18 G | Refills: 1 | Status: SHIPPED | OUTPATIENT
Start: 2019-01-29 | End: 2019-12-27

## 2019-01-29 RX ORDER — MONTELUKAST SODIUM 10 MG/1
TABLET ORAL
Qty: 90 TABLET | Refills: 3 | Status: SHIPPED | OUTPATIENT
Start: 2019-01-29 | End: 2020-10-13 | Stop reason: ALTCHOICE

## 2019-01-29 NOTE — PROGRESS NOTES
REASON FOR VISIT:    Neva Park is a 46year old female who presents for an [address]. Asthma-no complaints. Feels well controlled taking medications as prescribed no nighttime symptoms. Flu shot is up-to-date, due for Pneumovax. Sulfate HFA (VENTOLIN HFA) 108 (90 Base) MCG/ACT Inhalation Aero Soln INHALE TWO PUFFS BY MOUTH EVERY FOUR HOURS AS NEEDED FOR WHEEZING OR SHORTNESS OF BREATH Disp: 18 g Rfl: 1   Levothyroxine Sodium 100 MCG Oral Tab TAKE ONE TABLET BY MOUTH ONE TIME DAILY male age 38-65 or a female age 47-67, do you take aspirin?: No    Have you had any immunizations at another office such as Influenza, Hepatitis B, Tetanus, or Pneumococcal?: No    Domestic Abuse: No     CAGE:     Cut : No    Annoyed : No    Guilty : No Hepatitis C Screening Screen those at high risk plus screen one time for adults born 1946-1 965 No results found for: HCVAB    Tuberculosis Screen if high risk No components found for: PPDINDURAT      Disease Monitoring:    SPECIFIC DISEASE MONITORING In Dispersible Take 1 tablet (15 mg total) by mouth daily. Disp: 90 tablet Rfl: 3   melatonin 1 MG Oral Tab Take 3 mg by mouth nightly. Disp:  Rfl:    Calcium Polycarbophil (FIBERTAB OR) Take by mouth.  Disp:  Rfl:    Azelastine HCl 0.05 % Ophthalmic Solutio lesions  EYES: denies blurred vision or double vision  HEENT: denies nasal congestion, sinus pain or ST  LUNGS: denies shortness of breath with exertion  CARDIOVASCULAR: denies chest pain on exertion  GI: denies abdominal pain, denies heartburn  : denies processed food and sugary drinks and sodas. Diet should include lean meats and vegetables including 5-7 servings of fruit and vegetables total in 1 day.   Never skip breakfast.  -Encouraged exercise 30 minutes to 60 minutes 3-5 times daily to prevent Keysha Zuñiga Positive Rubella Titer 06/21/2016   • TDAP 06/21/2016   • Tb Intradermal Test 05/08/2017, 05/15/2017   • Varicella Deferred (Had Chicken Pox) 02/17/1969       Influenza Annually   Pneumococcal if high risk   Td/Tdap once then every 10 years   HPV Females 1

## 2019-01-29 NOTE — PATIENT INSTRUCTIONS
As of October 6th 2014, the Drug Enforcement Agency [facility]) is reclassifying all hydrocodone combination medications from Schedule III to Schedule II. This includes medications such as Norco, Vicodin, Lortab, Zohydro, and Vicoprofen.      What this means for exam.      -Encourage healthy diet of whole food and avoid processed food and sugary drinks and sodas. Diet should include lean meats and vegetables including 5-7 servings of fruit and vegetables total in 1 day.   Never skip breakfast.  -Encouraged exercis women without symptoms at any age who are overweight or obese and have 1 or more additional risk factors for diabetes.  At  least every 3 years   Type 2 diabetes or prediabetes All women diagnosed with gestational diabetes Lifelong testing every 3 years   T Lung cancer Adults age 54 to [de-identified] who have smoked Yearly screening in smokers with 30 pack-year history of smoking or who quit within 15 years   Obesity All women in this age group At routine exams   Osteoporosis Women who are postmenopausal Ask your healt pneumococcal bacteria)   Tetanus/diphtheria/pertussis (Td/Tdap) booster All women in this age group Td every 10 years, or a one-time dose of Tdap instead of a Td booster after age 25, then Td every 8 years   Zoster All women ages 61 and older 1 dose   Cou and kidneys into a form your body can use.  Most tests for vitamin D check the level of a form circulating in the body called 25-hydroxyvitamin D, also called 25(OH)D.   Why do I need this test?  You may need this test if your healthcare provider wants to c converting the vitamin as it should. This might be because of kidney or liver disease. Above-normal levels may be a sign that you're taking too much in supplement form.   How is this test done? The test is done with a blood sample.  A needle is used to  Calcium (mg) per serving   Source   Calcium (mg) per serving   Low-fat yogurt, plain   415 mg/8 oz.   Sardines, Atlantic, canned, with bones   351 mg/3 oz.   Oatmeal, instant, fortified   215 mg/1 cup   Nonfat milk   302 mg/1 cup   Lavalette, sockeye, canned, starting out. Work up to being active 30 minutes on most days. Aim for a total of 150 or more minutes a week. Why be fit?   People who are physically fit:  · Are more alert and productive  · Have more energy, both physically and mentally  · Handle stress b

## 2019-02-05 ENCOUNTER — NURSE ONLY (OUTPATIENT)
Dept: FAMILY MEDICINE CLINIC | Facility: CLINIC | Age: 54
End: 2019-02-05
Payer: COMMERCIAL

## 2019-02-05 ENCOUNTER — TELEPHONE (OUTPATIENT)
Dept: FAMILY MEDICINE CLINIC | Facility: CLINIC | Age: 54
End: 2019-02-05

## 2019-02-05 DIAGNOSIS — E06.3 HASHIMOTO'S THYROIDITIS: Primary | ICD-10-CM

## 2019-02-05 LAB
ALBUMIN SERPL-MCNC: 3.9 G/DL (ref 3.1–4.5)
ALBUMIN/GLOB SERPL: 1.1 {RATIO} (ref 1–2)
ALP LIVER SERPL-CCNC: 56 U/L (ref 41–108)
ALT SERPL-CCNC: 22 U/L (ref 14–54)
ANION GAP SERPL CALC-SCNC: 11 MMOL/L (ref 0–18)
AST SERPL-CCNC: 15 U/L (ref 15–41)
BASOPHILS # BLD AUTO: 0.09 X10(3) UL (ref 0–0.2)
BASOPHILS NFR BLD AUTO: 1.2 %
BILIRUB SERPL-MCNC: 0.3 MG/DL (ref 0.1–2)
BUN BLD-MCNC: 9 MG/DL (ref 8–20)
BUN/CREAT SERPL: 12.9 (ref 10–20)
CALCIUM BLD-MCNC: 8.7 MG/DL (ref 8.3–10.3)
CHLORIDE SERPL-SCNC: 103 MMOL/L (ref 101–111)
CHOLEST SMN-MCNC: 213 MG/DL (ref ?–200)
CO2 SERPL-SCNC: 25 MMOL/L (ref 22–32)
CREAT BLD-MCNC: 0.7 MG/DL (ref 0.55–1.02)
DEPRECATED RDW RBC AUTO: 42.6 FL (ref 35.1–46.3)
EOSINOPHIL # BLD AUTO: 0.22 X10(3) UL (ref 0–0.7)
EOSINOPHIL NFR BLD AUTO: 3 %
ERYTHROCYTE [DISTWIDTH] IN BLOOD BY AUTOMATED COUNT: 13.6 % (ref 11–15)
GLOBULIN PLAS-MCNC: 3.5 G/DL (ref 2.8–4.4)
GLUCOSE BLD-MCNC: 85 MG/DL (ref 70–99)
HCT VFR BLD AUTO: 40.1 % (ref 35–48)
HDLC SERPL-MCNC: 63 MG/DL (ref 40–59)
HGB BLD-MCNC: 13 G/DL (ref 12–16)
IMM GRANULOCYTES # BLD AUTO: 0.02 X10(3) UL (ref 0–1)
IMM GRANULOCYTES NFR BLD: 0.3 %
LDLC SERPL CALC-MCNC: 138 MG/DL (ref ?–100)
LYMPHOCYTES # BLD AUTO: 2.82 X10(3) UL (ref 1–4)
LYMPHOCYTES NFR BLD AUTO: 38.3 %
M PROTEIN MFR SERPL ELPH: 7.4 G/DL (ref 6.4–8.2)
MCH RBC QN AUTO: 27.6 PG (ref 26–34)
MCHC RBC AUTO-ENTMCNC: 32.4 G/DL (ref 31–37)
MCV RBC AUTO: 85.1 FL (ref 80–100)
MONOCYTES # BLD AUTO: 0.41 X10(3) UL (ref 0.1–1)
MONOCYTES NFR BLD AUTO: 5.6 %
NEUTROPHILS # BLD AUTO: 3.81 X10 (3) UL (ref 1.5–7.7)
NEUTROPHILS # BLD AUTO: 3.81 X10(3) UL (ref 1.5–7.7)
NEUTROPHILS NFR BLD AUTO: 51.6 %
NONHDLC SERPL-MCNC: 150 MG/DL (ref ?–130)
OSMOLALITY SERPL CALC.SUM OF ELEC: 286 MOSM/KG (ref 275–295)
PLATELET # BLD AUTO: 291 10(3)UL (ref 150–450)
POTASSIUM SERPL-SCNC: 4.2 MMOL/L (ref 3.6–5.1)
RBC # BLD AUTO: 4.71 X10(6)UL (ref 3.8–5.3)
SODIUM SERPL-SCNC: 139 MMOL/L (ref 136–144)
T4 FREE SERPL-MCNC: 1 NG/DL (ref 0.9–1.8)
TRIGL SERPL-MCNC: 59 MG/DL (ref 30–149)
TSI SER-ACNC: 2.95 MIU/ML (ref 0.35–5.5)
VLDLC SERPL CALC-MCNC: 12 MG/DL (ref 0–30)
WBC # BLD AUTO: 7.4 X10(3) UL (ref 4–11)

## 2019-02-05 PROCEDURE — 80050 GENERAL HEALTH PANEL: CPT | Performed by: FAMILY MEDICINE

## 2019-02-05 PROCEDURE — 80061 LIPID PANEL: CPT | Performed by: FAMILY MEDICINE

## 2019-02-05 PROCEDURE — 36415 COLL VENOUS BLD VENIPUNCTURE: CPT | Performed by: FAMILY MEDICINE

## 2019-02-05 PROCEDURE — 84439 ASSAY OF FREE THYROXINE: CPT | Performed by: FAMILY MEDICINE

## 2019-02-08 ENCOUNTER — TELEPHONE (OUTPATIENT)
Dept: FAMILY MEDICINE CLINIC | Facility: CLINIC | Age: 54
End: 2019-02-08

## 2019-02-08 NOTE — TELEPHONE ENCOUNTER
Patient returning nurses call for results,  Patient said she will call back on Monday because she will be out of town.

## 2019-02-08 NOTE — TELEPHONE ENCOUNTER
LMOM to return call to the office. Provided pt office phone [phone] along with office hours given.

## 2019-02-08 NOTE — TELEPHONE ENCOUNTER
----- Message from Authur Gosselin, DO sent at 2/6/2019  4:20 PM CST -----  Mychart notified:  Your labs are normal except your lipid panel is mildly elevated. Refer to [address] [place] [address] patient message.

## 2019-02-27 ENCOUNTER — OFFICE VISIT (OUTPATIENT)
Dept: FAMILY MEDICINE CLINIC | Facility: CLINIC | Age: 54
End: 2019-02-27
Payer: COMMERCIAL

## 2019-02-27 VITALS
HEART RATE: 74 BPM | BODY MASS INDEX: 30 KG/M2 | SYSTOLIC BLOOD PRESSURE: 110 MMHG | TEMPERATURE: 98 F | DIASTOLIC BLOOD PRESSURE: 68 MMHG | OXYGEN SATURATION: 99 % | RESPIRATION RATE: 18 BRPM | WEIGHT: 164 LBS

## 2019-02-27 DIAGNOSIS — R30.0 DYSURIA: ICD-10-CM

## 2019-02-27 DIAGNOSIS — N30.01 ACUTE CYSTITIS WITH HEMATURIA: Primary | ICD-10-CM

## 2019-02-27 LAB
APPEARANCE: CLEAR
BILIRUBIN: NEGATIVE
GLUCOSE (URINE DIPSTICK): NEGATIVE MG/DL
KETONES (URINE DIPSTICK): NEGATIVE MG/DL
MULTISTIX LOT#: NORMAL NUMERIC
NITRITE, URINE: NEGATIVE
PH, URINE: 6.5 (ref 4.5–8)
PROTEIN (URINE DIPSTICK): NEGATIVE MG/DL
SPECIFIC GRAVITY: 1.01 (ref 1–1.03)
URINE-COLOR: YELLOW
UROBILINOGEN,SEMI-QN: 0.2 MG/DL (ref 0–1.9)

## 2019-02-27 PROCEDURE — 87077 CULTURE AEROBIC IDENTIFY: CPT | Performed by: FAMILY MEDICINE

## 2019-02-27 PROCEDURE — 81003 URINALYSIS AUTO W/O SCOPE: CPT | Performed by: FAMILY MEDICINE

## 2019-02-27 PROCEDURE — 99214 OFFICE O/P EST MOD 30 MIN: CPT | Performed by: FAMILY MEDICINE

## 2019-02-27 PROCEDURE — 87186 SC STD MICRODIL/AGAR DIL: CPT | Performed by: FAMILY MEDICINE

## 2019-02-27 PROCEDURE — 87086 URINE CULTURE/COLONY COUNT: CPT | Performed by: FAMILY MEDICINE

## 2019-02-27 RX ORDER — SULFAMETHOXAZOLE AND TRIMETHOPRIM 800; 160 MG/1; MG/1
1 TABLET ORAL 2 TIMES DAILY
Qty: 6 TABLET | Refills: 0 | Status: SHIPPED | OUTPATIENT
Start: 2019-02-27 | End: 2019-03-02

## 2019-02-27 RX ORDER — PHENAZOPYRIDINE HYDROCHLORIDE 100 MG/1
100 TABLET, FILM COATED ORAL 3 TIMES DAILY PRN
Qty: 6 TABLET | Refills: 0 | Status: SHIPPED | OUTPATIENT
Start: 2019-02-27 | End: 2019-03-01

## 2019-02-27 NOTE — PROGRESS NOTES
CHIEF COMPLAINT: Patient presents with:  Dysuria: x1d  Urinary Frequency  Urinary Urgency  Body ache and/or chills        HPI:     Radha Harrell is a 53year old female presents for dysuria. Pt p/w a 1-day h/o dysuria sx.  She has urinary frequency, fe social    Drug use: No       Medications (Active prior to today's visit):    Current Outpatient Medications:  Sulfamethoxazole-TMP -160 MG Oral Tab per tablet Take 1 tablet by mouth 2 (two) times daily for 3 days.  Disp: 6 tablet Rfl: 0   Phenazopyrid for bladder incontinence, hematuria, flank pain and pelvic pain. Musculoskeletal: Positive for myalgias. Pertinent positives and negatives noted in the the HPI.     PHYSICAL EXAM:   /68 (BP Location: Right arm, Patient Position: Sitting, Cuff 02/05/2019 8.7    • Calculated Osmolality 02/05/2019 286    • GFR, Non- 02/05/2019 99    • GFR, -American 02/05/2019 114    • AST 02/05/2019 15    • ALT 02/05/2019 22    • Alkaline Phosphatase 02/05/2019 56    • Bilirubin, Total 02/0 tablet; Refill: 0  - Phenazopyridine HCl (PYRIDIUM) 100 MG Oral Tab; Take 1 tablet (100 mg total) by mouth 3 (three) times daily as needed for Pain. Dispense: 6 tablet; Refill: 0  - URINE CULTURE, ROUTINE    2. Dysuria  See above.   - URINALYSIS, AUTO, W/O MD      Patient understands plan and follow-up. Return with your PCP if symptoms worsen or fail to improve.

## 2019-03-22 DIAGNOSIS — E03.9 ACQUIRED HYPOTHYROIDISM: ICD-10-CM

## 2019-03-22 RX ORDER — LEVOTHYROXINE SODIUM 0.1 MG/1
TABLET ORAL
Qty: 30 TABLET | Refills: 11 | Status: CANCELLED | OUTPATIENT
Start: 2019-03-22

## 2019-03-22 NOTE — TELEPHONE ENCOUNTER
Pt requesting refill of levothyroxine,     Last Time Medication was Filled:  10/11/18 #30 with 11 refills    Last Office Visit with PCP: 1/29/19    No future appointments.     Spoke to pharmacy, they informed pt has refills on file and pt can pickup at [address]

## 2019-07-01 ENCOUNTER — OFFICE VISIT (OUTPATIENT)
Dept: FAMILY MEDICINE CLINIC | Facility: CLINIC | Age: 54
End: 2019-07-01
Payer: COMMERCIAL

## 2019-07-01 VITALS
OXYGEN SATURATION: 99 % | HEART RATE: 69 BPM | RESPIRATION RATE: 18 BRPM | TEMPERATURE: 98 F | BODY MASS INDEX: 29 KG/M2 | DIASTOLIC BLOOD PRESSURE: 80 MMHG | SYSTOLIC BLOOD PRESSURE: 114 MMHG | WEIGHT: 163.38 LBS

## 2019-07-01 DIAGNOSIS — H66.001 ACUTE SUPPURATIVE OTITIS MEDIA OF RIGHT EAR WITHOUT SPONTANEOUS RUPTURE OF TYMPANIC MEMBRANE, RECURRENCE NOT SPECIFIED: Primary | ICD-10-CM

## 2019-07-01 DIAGNOSIS — J01.00 ACUTE MAXILLARY SINUSITIS, RECURRENCE NOT SPECIFIED: ICD-10-CM

## 2019-07-01 PROCEDURE — 99213 OFFICE O/P EST LOW 20 MIN: CPT | Performed by: NURSE PRACTITIONER

## 2019-07-01 RX ORDER — FLUTICASONE PROPIONATE 50 MCG
2 SPRAY, SUSPENSION (ML) NASAL DAILY
Qty: 1 BOTTLE | Refills: 0 | Status: SHIPPED | OUTPATIENT
Start: 2019-07-01 | End: 2020-06-25

## 2019-07-01 RX ORDER — AMOXICILLIN AND CLAVULANATE POTASSIUM 875; 125 MG/1; MG/1
1 TABLET, FILM COATED ORAL 2 TIMES DAILY
Qty: 20 TABLET | Refills: 0 | Status: SHIPPED | OUTPATIENT
Start: 2019-07-01 | End: 2019-07-11

## 2019-07-01 NOTE — PROGRESS NOTES
CHIEF COMPLAINT:   Patient presents with:  Sore Throat  Ear Pain      HPI:   Alejandro Hutton is a 53year old female who presents for upper respiratory symptoms for  1 weeks. Sx have worsened over the past 24 hours.  Patient reports sore throat, congestio Naproxen Sodium (ALEVE OR) Take by mouth.  Disp:  Rfl:    Montelukast Sodium 10 MG Oral Tab TAKE ONE TABLET BY MOUTH NIGHTLY Disp: 90 tablet Rfl: 3      Past Medical History:   Diagnosis Date   • Allergic rhinitis    • Basal cell carcinoma (BCC) of skin of HEAD: atraumatic, normocephalic.  moderate tenderness on palpation of right maxillary and right frontal sinuses.   EYES: conjunctiva clear, EOM intact  EARS: TM's with large bilateral effusions, erythema and small bulge noted on right  NOSE: Nostrils patent The sinuses are air-filled spaces within the bones of the face. They connect to the inside of the nose. Sinusitis is an inflammation of the tissue that lines the sinuses. Sinusitis can occur during a cold.  It can also happen due to allergies to pollens and · Do not use nasal rinses or irrigation during an acute sinus infection, unless your healthcare provider tells you to. Rinsing may spread the infection to other areas in your sinuses.   · Use acetaminophen or ibuprofen to control pain, unless another pain m

## 2019-08-01 ENCOUNTER — TELEPHONE (OUTPATIENT)
Dept: FAMILY MEDICINE CLINIC | Facility: CLINIC | Age: 54
End: 2019-08-01

## 2019-08-01 DIAGNOSIS — J45.31 MILD PERSISTENT ASTHMA WITH ACUTE EXACERBATION: ICD-10-CM

## 2019-08-01 NOTE — TELEPHONE ENCOUNTER
Patient called and would like a refill on her [place] Tita but said that her insurance needs pre approval.  She said that [facility] sent over a request for it. Please call her when approved.

## 2019-08-01 NOTE — TELEPHONE ENCOUNTER
Rx sent to [employer]. Will have them process the claim and send Prior Auth information if needed.

## 2019-08-07 NOTE — TELEPHONE ENCOUNTER
PA initiated through Cover [facility] has indicated that it is too soon to refill this medication at the pharmacy for your patient.  If you need to renew an existing PA for your patient's medication, please reach out to [facility] directly at

## 2019-09-16 ENCOUNTER — TELEPHONE (OUTPATIENT)
Dept: GASTROENTEROLOGY | Age: 54
End: 2019-09-16

## 2019-10-10 ENCOUNTER — OFFICE VISIT (OUTPATIENT)
Dept: GASTROENTEROLOGY | Age: 54
End: 2019-10-10

## 2019-10-10 VITALS
DIASTOLIC BLOOD PRESSURE: 80 MMHG | SYSTOLIC BLOOD PRESSURE: 112 MMHG | BODY MASS INDEX: 26.98 KG/M2 | WEIGHT: 158 LBS | HEIGHT: 64 IN

## 2019-10-10 DIAGNOSIS — Z86.010 PERSONAL HISTORY OF COLONIC POLYPS: Primary | ICD-10-CM

## 2019-10-10 DIAGNOSIS — K21.9 GERD WITHOUT ESOPHAGITIS: ICD-10-CM

## 2019-10-10 PROCEDURE — 99243 OFF/OP CNSLTJ NEW/EST LOW 30: CPT | Performed by: INTERNAL MEDICINE

## 2019-10-10 RX ORDER — BISACODYL 5 MG/1
TABLET, DELAYED RELEASE ORAL
Qty: 2 TABLET | Refills: 0 | Status: SHIPPED | OUTPATIENT
Start: 2019-10-10 | End: 2019-11-24

## 2019-10-10 RX ORDER — AZELASTINE HYDROCHLORIDE 0.5 MG/ML
1 SOLUTION/ DROPS OPHTHALMIC PRN
COMMUNITY
Start: 2017-09-29

## 2019-10-10 RX ORDER — UREA 10 %
3 LOTION (ML) TOPICAL AT BEDTIME
COMMUNITY

## 2019-10-10 RX ORDER — FLUTICASONE PROPIONATE 50 MCG
2 SPRAY, SUSPENSION (ML) NASAL PRN
COMMUNITY
Start: 2019-07-01 | End: 2020-06-25

## 2019-10-10 RX ORDER — MOMETASONE FUROATE AND FORMOTEROL FUMARATE DIHYDRATE 200; 5 UG/1; UG/1
AEROSOL RESPIRATORY (INHALATION) DAILY
COMMUNITY
Start: 2019-09-25

## 2019-10-10 RX ORDER — LEVOTHYROXINE SODIUM 0.1 MG/1
TABLET ORAL DAILY
COMMUNITY
Start: 2016-10-20

## 2019-10-10 RX ORDER — SIMETHICONE 125 MG
TABLET,CHEWABLE ORAL
Qty: 2 TABLET | Refills: 0 | Status: SHIPPED | OUTPATIENT
Start: 2019-10-10 | End: 2019-11-24

## 2019-10-10 RX ORDER — ALBUTEROL SULFATE 90 UG/1
AEROSOL, METERED RESPIRATORY (INHALATION) PRN
COMMUNITY
Start: 2015-10-19

## 2019-10-16 RX ORDER — AZELASTINE HYDROCHLORIDE 0.5 MG/ML
SOLUTION/ DROPS OPHTHALMIC
Qty: 6 ML | Refills: 0 | Status: SHIPPED | OUTPATIENT
Start: 2019-10-16 | End: 2020-10-13 | Stop reason: ALTCHOICE

## 2019-10-26 DIAGNOSIS — E03.9 ACQUIRED HYPOTHYROIDISM: ICD-10-CM

## 2019-10-28 RX ORDER — LEVOTHYROXINE SODIUM 0.1 MG/1
TABLET ORAL
Qty: 90 TABLET | Refills: 0 | Status: SHIPPED | OUTPATIENT
Start: 2019-10-28 | End: 2020-01-29

## 2019-10-28 NOTE — TELEPHONE ENCOUNTER
Requested Prescriptions     Pending Prescriptions Disp Refills   • LEVOTHYROXINE SODIUM 100 MCG Oral Tab [Pharmacy Med Name: Levothyroxine Sodium 100 Mcg Tab Sand] 30 tablet 10     Sig: TAKE ONE TABLET BY MOUTH ONE TIME DAILY before breakfast       Pt requ

## 2019-11-04 ENCOUNTER — TELEPHONE (OUTPATIENT)
Dept: GASTROENTEROLOGY | Age: 54
End: 2019-11-04

## 2019-11-12 ENCOUNTER — APPOINTMENT (OUTPATIENT)
Dept: GASTROENTEROLOGY | Age: 54
End: 2019-11-12
Attending: INTERNAL MEDICINE

## 2019-11-18 ENCOUNTER — TELEPHONE (OUTPATIENT)
Dept: FAMILY MEDICINE CLINIC | Facility: CLINIC | Age: 54
End: 2019-11-18

## 2019-11-18 NOTE — TELEPHONE ENCOUNTER
Patient called to let doctor know that she is getting her testing done the beginning of January and then will make a follow up appt.

## 2019-12-25 DIAGNOSIS — K29.30 CHRONIC SUPERFICIAL GASTRITIS WITHOUT BLEEDING: ICD-10-CM

## 2019-12-27 DIAGNOSIS — J45.31 MILD PERSISTENT ASTHMA WITH ACUTE EXACERBATION: ICD-10-CM

## 2019-12-27 RX ORDER — MECOBALAMIN 5000 MCG
TABLET,DISINTEGRATING ORAL
Qty: 90 CAPSULE | Refills: 0 | Status: SHIPPED | OUTPATIENT
Start: 2019-12-27 | End: 2020-03-23

## 2019-12-27 RX ORDER — ALBUTEROL SULFATE 90 UG/1
AEROSOL, METERED RESPIRATORY (INHALATION)
Qty: 18 G | Refills: 0 | Status: SHIPPED | OUTPATIENT
Start: 2019-12-27 | End: 2020-12-07

## 2019-12-27 NOTE — TELEPHONE ENCOUNTER
Pt requesting refill of   Requested Prescriptions     Pending Prescriptions Disp Refills   • Albuterol Sulfate HFA (VENTOLIN HFA) 108 (90 Base) MCG/ACT Inhalation Aero Soln [Pharmacy Med Name: Ventolin Hfa 108 Mcg/Act Aer Glax] 18 g 0     Sig: INHALE 2 PUF

## 2020-01-08 ENCOUNTER — E-ADVICE (OUTPATIENT)
Dept: GASTROENTEROLOGY | Age: 55
End: 2020-01-08

## 2020-01-10 ENCOUNTER — OFFICE VISIT (OUTPATIENT)
Dept: GASTROENTEROLOGY | Age: 55
End: 2020-01-10
Attending: INTERNAL MEDICINE

## 2020-01-10 DIAGNOSIS — D12.2 BENIGN NEOPLASM OF ASCENDING COLON: ICD-10-CM

## 2020-01-10 DIAGNOSIS — Z86.010 PERSONAL HISTORY OF COLONIC POLYPS: ICD-10-CM

## 2020-01-10 DIAGNOSIS — Z86.010 PERSONAL HISTORY OF COLONIC POLYPS: Primary | ICD-10-CM

## 2020-01-10 PROCEDURE — 45380 COLONOSCOPY AND BIOPSY: CPT | Performed by: INTERNAL MEDICINE

## 2020-01-10 PROCEDURE — 45385 COLONOSCOPY W/LESION REMOVAL: CPT | Performed by: INTERNAL MEDICINE

## 2020-01-14 LAB — AP REPORT: NORMAL

## 2020-01-23 DIAGNOSIS — J45.31 MILD PERSISTENT ASTHMA WITH ACUTE EXACERBATION: ICD-10-CM

## 2020-01-23 DIAGNOSIS — E03.9 ACQUIRED HYPOTHYROIDISM: ICD-10-CM

## 2020-01-23 NOTE — TELEPHONE ENCOUNTER
LMOM to return call to the office. Provided pt office phone [phone] along with office hours given.     Patient due for annual appointment    [employer] message sent

## 2020-01-27 ENCOUNTER — TELEPHONE (OUTPATIENT)
Dept: FAMILY MEDICINE CLINIC | Facility: CLINIC | Age: 55
End: 2020-01-27

## 2020-01-27 DIAGNOSIS — Z12.39 SCREENING FOR MALIGNANT NEOPLASM OF BREAST: Primary | ICD-10-CM

## 2020-01-27 NOTE — TELEPHONE ENCOUNTER
LMOM to return call to the office as this is my 2nd attempt to try to reach you. Provided pt office phone [phone] along with office hours given.

## 2020-01-27 NOTE — TELEPHONE ENCOUNTER
Patient calling to request new mammo order.  Previous will exp in 2 day  patient due for annual physical apt    Reminded patient to make apt for February    Patient would like to go to [facility] order at  for

## 2020-01-29 RX ORDER — LEVOTHYROXINE SODIUM 0.1 MG/1
TABLET ORAL
Qty: 90 TABLET | Refills: 0 | Status: SHIPPED | OUTPATIENT
Start: 2020-01-29 | End: 2020-12-07

## 2020-01-29 RX ORDER — MOMETASONE FUROATE AND FORMOTEROL FUMARATE DIHYDRATE 200; 5 UG/1; UG/1
AEROSOL RESPIRATORY (INHALATION)
Qty: 13 G | Refills: 0 | OUTPATIENT
Start: 2020-01-29

## 2020-01-29 NOTE — TELEPHONE ENCOUNTER
Pt requesting refill of dulera and levothyrixne    Protocol failed, unable to approve, due to apt due:      Last Time Medication was Filled:  8/1/2019 x 1 and 110/28/2019    Last Office Visit with PCP: 1/29/2019      Called pharmacy to cancel levothyroxine

## 2020-02-24 ENCOUNTER — MED REC SCAN ONLY (OUTPATIENT)
Dept: FAMILY MEDICINE CLINIC | Facility: CLINIC | Age: 55
End: 2020-02-24

## 2020-03-23 ENCOUNTER — TELEPHONE (OUTPATIENT)
Dept: FAMILY MEDICINE CLINIC | Facility: CLINIC | Age: 55
End: 2020-03-23

## 2020-03-23 DIAGNOSIS — J45.30 MILD PERSISTENT ASTHMA WITHOUT COMPLICATION: ICD-10-CM

## 2020-03-23 DIAGNOSIS — K29.30 CHRONIC SUPERFICIAL GASTRITIS WITHOUT BLEEDING: ICD-10-CM

## 2020-03-23 DIAGNOSIS — R19.7 DIARRHEA, UNSPECIFIED TYPE: ICD-10-CM

## 2020-03-23 DIAGNOSIS — J00 ACUTE NASOPHARYNGITIS: Primary | ICD-10-CM

## 2020-03-23 PROCEDURE — 99442 PHONE E/M BY PHYS 11-20 MIN: CPT | Performed by: FAMILY MEDICINE

## 2020-03-23 RX ORDER — MECOBALAMIN 5000 MCG
TABLET,DISINTEGRATING ORAL
Qty: 90 CAPSULE | Refills: 1 | Status: SHIPPED | OUTPATIENT
Start: 2020-03-23 | End: 2020-10-13 | Stop reason: ALTCHOICE

## 2020-03-23 NOTE — TELEPHONE ENCOUNTER
Virtual/Telephone Check-In    Criselda Wood verbally consents to a Virtual/Telephone Check-In service on 03/23/20. Patient understands and accepts financial responsibility for any deductible, co-insurance and/or co-pays associated with this service.

## 2020-03-23 NOTE — TELEPHONE ENCOUNTER
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 Patient has cold symptoms, sore throat, stuffy nose, chest heaviness due to asthma.  This am started diarrhea, which is very Lea

## 2020-03-23 NOTE — TELEPHONE ENCOUNTER
Patient called saying that she has cold symptoms, diarrhea and sore throat and stuffing nose.   She does have asthma so is having chest heaviest.

## 2020-06-25 ENCOUNTER — TELEPHONE (OUTPATIENT)
Dept: FAMILY MEDICINE CLINIC | Facility: CLINIC | Age: 55
End: 2020-06-25

## 2020-06-25 NOTE — TELEPHONE ENCOUNTER
LOV virtual 3/23/2020  Patient going out of town tomorrow and would like rapid COVID testing  This is not an option and recommend patient go to an independent testing area  Patient aware of locations and verbalizes understanding

## 2020-06-25 NOTE — TELEPHONE ENCOUNTER
Pt called office stating she is having a sore throat, and wants to know if she can get tested for [address]. Offered pt a virtual appt, pt declined.

## 2020-08-25 ENCOUNTER — TELEPHONE (OUTPATIENT)
Dept: FAMILY MEDICINE CLINIC | Facility: CLINIC | Age: 55
End: 2020-08-25

## 2020-08-25 NOTE — TELEPHONE ENCOUNTER
Pt called office stating that she is at a specialist office and they need to know what antibiotic pt is allergic too. Please call back at [phone].

## 2020-09-03 DIAGNOSIS — J45.31 MILD PERSISTENT ASTHMA WITH ACUTE EXACERBATION: ICD-10-CM

## 2020-09-04 RX ORDER — MOMETASONE FUROATE AND FORMOTEROL FUMARATE DIHYDRATE 200; 5 UG/1; UG/1
AEROSOL RESPIRATORY (INHALATION)
Qty: 13 G | Refills: 0 | OUTPATIENT
Start: 2020-09-04

## 2020-09-04 NOTE — TELEPHONE ENCOUNTER
Requested Prescriptions     Refused Prescriptions Disp Refills   • Felicity Braxton 52 200-5 MCG/ACT Inhalation Aerosol [Pharmacy Med Name: Falcon Allison 200-5 Mcg/Act Aer Merc] 13 g 0     Sig: Inhale 1 puff into the lungs 2 times daily.      Refused By: Summer Coelho

## 2020-10-13 ENCOUNTER — OFFICE VISIT (OUTPATIENT)
Dept: FAMILY MEDICINE CLINIC | Facility: CLINIC | Age: 55
End: 2020-10-13
Payer: COMMERCIAL

## 2020-10-13 VITALS
HEART RATE: 60 BPM | BODY MASS INDEX: 30.22 KG/M2 | WEIGHT: 164.19 LBS | TEMPERATURE: 98 F | DIASTOLIC BLOOD PRESSURE: 84 MMHG | HEIGHT: 62 IN | RESPIRATION RATE: 18 BRPM | OXYGEN SATURATION: 100 % | SYSTOLIC BLOOD PRESSURE: 124 MMHG

## 2020-10-13 DIAGNOSIS — Z13.0 SCREENING FOR ENDOCRINE, NUTRITIONAL, METABOLIC AND IMMUNITY DISORDER: ICD-10-CM

## 2020-10-13 DIAGNOSIS — J45.20 MILD INTERMITTENT ASTHMA WITHOUT COMPLICATION: ICD-10-CM

## 2020-10-13 DIAGNOSIS — Z13.6 SCREENING FOR HEART DISEASE: ICD-10-CM

## 2020-10-13 DIAGNOSIS — Z13.21 SCREENING FOR ENDOCRINE, NUTRITIONAL, METABOLIC AND IMMUNITY DISORDER: ICD-10-CM

## 2020-10-13 DIAGNOSIS — Z13.29 SCREENING FOR ENDOCRINE, NUTRITIONAL, METABOLIC AND IMMUNITY DISORDER: ICD-10-CM

## 2020-10-13 DIAGNOSIS — Z13.228 SCREENING FOR ENDOCRINE, NUTRITIONAL, METABOLIC AND IMMUNITY DISORDER: ICD-10-CM

## 2020-10-13 DIAGNOSIS — Z13.0 SCREENING FOR IRON DEFICIENCY ANEMIA: ICD-10-CM

## 2020-10-13 DIAGNOSIS — E03.9 ACQUIRED HYPOTHYROIDISM: ICD-10-CM

## 2020-10-13 DIAGNOSIS — Z12.31 ENCOUNTER FOR SCREENING MAMMOGRAM FOR MALIGNANT NEOPLASM OF BREAST: ICD-10-CM

## 2020-10-13 DIAGNOSIS — Z12.11 SCREEN FOR COLON CANCER: ICD-10-CM

## 2020-10-13 DIAGNOSIS — Z00.00 ANNUAL PHYSICAL EXAM: Primary | ICD-10-CM

## 2020-10-13 DIAGNOSIS — Z23 NEED FOR VACCINATION: ICD-10-CM

## 2020-10-13 PROCEDURE — 3074F SYST BP LT 130 MM HG: CPT | Performed by: FAMILY MEDICINE

## 2020-10-13 PROCEDURE — 90686 IIV4 VACC NO PRSV 0.5 ML IM: CPT | Performed by: FAMILY MEDICINE

## 2020-10-13 PROCEDURE — 3079F DIAST BP 80-89 MM HG: CPT | Performed by: FAMILY MEDICINE

## 2020-10-13 PROCEDURE — 90471 IMMUNIZATION ADMIN: CPT | Performed by: FAMILY MEDICINE

## 2020-10-13 PROCEDURE — 3008F BODY MASS INDEX DOCD: CPT | Performed by: FAMILY MEDICINE

## 2020-10-13 PROCEDURE — 99396 PREV VISIT EST AGE 40-64: CPT | Performed by: FAMILY MEDICINE

## 2020-10-13 RX ORDER — BUDESONIDE AND FORMOTEROL FUMARATE DIHYDRATE 160; 4.5 UG/1; UG/1
2 AEROSOL RESPIRATORY (INHALATION) 2 TIMES DAILY
Qty: 10.2 INHALER | Refills: 3 | Status: SHIPPED | OUTPATIENT
Start: 2020-10-13 | End: 2020-12-07

## 2020-10-13 RX ORDER — BUDESONIDE AND FORMOTEROL FUMARATE DIHYDRATE 80; 4.5 UG/1; UG/1
AEROSOL RESPIRATORY (INHALATION) 2 TIMES DAILY
COMMUNITY
End: 2020-11-19

## 2020-10-13 NOTE — PROGRESS NOTES
REASON FOR VISIT:    Chacha Nevarez is a 63year old female who presents for an [address]. Asthma-Dulera not covered. Given symbicort by other physician- 1 puff daily and feels not controlled.  Using albuterol inhaler a few times a week kg)  02/27/19 : 164 lb (74.4 kg)  01/29/19 : 164 lb (74.4 kg)  10/11/18 : 164 lb (74.4 kg)  06/28/18 : 161 lb 12.8 oz (73.4 kg)      Patient Active Problem List:     Asthma     GERD (gastroesophageal reflux disease)     Family history of esophageal cancer HDL 73 06/21/2016     No results found for: TRIGLY  Lab Results   Component Value Date     (H) 02/05/2019     06/21/2016     Lab Results   Component Value Date    AST 15 02/05/2019    AST 16 06/21/2016    AST 14 (L) 04/25/2015     Lab Results Colonoscopy due on 04/18/2019    Flex Sigmoidoscopy Screen  Every 5 years No results found for this or any previous visit.     Fecal Occult Blood  Annually Occult Blood, Stool (no units)   Date Value   06/21/2016 NEGATIVE       Obesity Screening Screen all exam  Annually No flowsheet data found. No flowsheet data found. Asthma  (Annually between Nov. 1 & Dec. 31)    Date of last AAP/ACT and counseling given on importance of controller meds.         10/13/20- 18, increase symbicort 2 puffs bid         KERMIT 4-2015    BENIGN   • OTHER SURGICAL HISTORY      cosmetic surgery of the face    • REDUCTION LEFT  2007    BREAST LIFT   • REDUCTION RIGHT Right 2007    BREAST LIFT      Family History   Problem Relation Age of Onset   • Gastro-Intestinal Disorder Father auscultation  CARDIO: RRR without murmur  GI: good BS's, no masses, HSM or tenderness  : introitus is normal, scant discharge, cervix is pink, no adnexal masses or tenderness,   RECTAL: good rectal tone  MUSCULOSKELETAL: back is not tender, FROM of the b about 6 weeks (around 11/24/2020) for discuss labs, and recheck asthma.     Diet counseling perfomed  Exercise counseling perfomed  STI Prevention counseling perfomed  Endometrial cancer awareness counseling performed    SUGGESTED VACCINATIONS - Influenza,

## 2020-10-13 NOTE — PATIENT INSTRUCTIONS
As of October 6th 2014, the Drug Enforcement Agency [facility]) is reclassifying all hydrocodone combination medications from Schedule III to Schedule II. This includes medications such as Norco, Vicodin, Lortab, Zohydro, and Vicoprofen.      What this means for exam.    -Encourage healthy diet of whole food and avoid processed food and sugary drinks and sodas. Diet should include lean meats and vegetables including 5-7 servings of fruit and vegetables total in 1 day.   Never skip breakfast.  -Encouraged exercise following:  · Improve your blood cholesterol level to help prevent further heart trouble  · Lower your blood pressure to help prevent a stroke or heart attack  · Control diabetes, or reduce your risk of getting this disease  · Improve your heart and lung f instructions. Eating Heart-Healthy Foods  Eating has a big impact on your heart health. In fact, eating healthier can improve several of your heart risks at once. For instance, it helps you manage weight, cholesterol, and blood pressure.  Here are idea foods  Aim to make these foods staples of your diet. If you have diabetes, you may have different recommendations than what is listed here:  · Fruits and vegetables provide plenty of nutrients without a lot of calories.  At meals, fill half your plate with horseradish, hot sauce, lemon, mustard, nonfat salad dressings, and vinegar. For salt-free herbs and spices, try basil, cilantro, cinnamon, pepper, and rosemary. Date Last Reviewed: 10/1/2017  © 8777-2674 The [employer] [address].  [facility], You can lessen bone loss by staying active and increasing your calcium intake. Calcium supplements and other osteoporosis treatments do have risks. So talk with your healthcare provider if you have concerns.  If you have osteoporosis, you can also learn way 1,300 mg  23to 27years old: 1,000 mg  32to 48years old: 1,000 mg  46to 79years old, women: 1,200 mg  46to 79years old, men: 1,000 mg  Pregnant or nursing: 15to 25years old: 1,300 mg, 23to 48years old: 1,000 mg  Older than 79 (women and men): 1, effects in the body.  You may need this test to help your healthcare provider diagnose or treat:  · Problems with the parathyroid gland  · Cancer  · Autoimmune diseases, such as multiple sclerosis and Crohn's disease  · Psoriasis  · Asthma  · Weakness or fa if any health conditions you have or medicines you take could affect your results. How do I get ready for this test?  Tell your healthcare provider if you take vitamin D supplements.  Be sure your healthcare provider knows about all medicines, herbs, vitam information is not intended as a substitute for professional medical care. Always follow your healthcare professional's instructions. Living with Osteoporosis: Regular Exercise  If you have osteoporosis, exercise is vital for your health.  It can p servings of fruit and vegetables total in 1 day.   Never skip breakfast.  -Encouraged exercise 30 minutes to 60 minutes 3-5 times weekly for 150minutes or more to prevent obesity and chronic disease and eliminate stress and its effect on the body.  -encoura or reduce your risk of getting this disease  · Improve your heart and lung function  · Reach and maintain a healthy weight  · Make your muscles stronger and more limber so you can stay active  · Prevent falls and fractures by slowing the loss of bone mass you manage weight, cholesterol, and blood pressure. Here are ideas to help you make heart-healthy changes without giving up all the foods and flavors you love.   Getting started  · Talk with your healthcare provider about eating plans, such as the Carolyn Bashir[address][phone] or M without a lot of calories. At meals, fill half your plate with these foods. Split the other half of your plate between whole grains and lean protein. · Whole grains are high in fiber and rich in vitamins and nutrients.  Good choices include whole-wheat jacinto 6673-4274 [employer] [address]. [address] [facility], [facility] [place]. All rights reserved. This information is not intended as a substitute for professional medical care. Always follow your healthcare professional's instructions.        What Is Os concerns. If you have osteoporosis, you can also learn ways to increase everyday safety. Date Last Reviewed: 5/1/2018  © 7032-4245 The [employer]. [address] [facility] [place] [facility], [address] [place] [address]. All rights reserved.  This information is not intended years old: 1,000 mg  Older than 79 (women and men): 1,200 mg   Date Last Reviewed: 5/1/2018  © 4068-1243 [employer] [address]. 140[address] [facility], [address] [place] [address]. All rights reserved.  This information is not intended as a substitute for profess disease  · Psoriasis  · Asthma  · Weakness or falls    What other tests might I have along with this test?  A healthcare provider may also want to check your parathyroid hormone levels and your calcium levels. What do my test results mean?   Test results provider knows about all medicines, herbs, vitamins, and supplements you are taking. This includes medicines that don't need a prescription and any illicit drugs you may use. © 9631-1540 The [employer] [address].  [facility], [facility], [address]. osteoporosis, exercise is vital for your health. It can prevent bone fractures and spine changes. It will slow bone loss. Exercise will strengthen your body. It can also be fun. A variety of exercises is best. See below for exercises that can help you.  But

## 2020-11-19 ENCOUNTER — TELEMEDICINE (OUTPATIENT)
Dept: FAMILY MEDICINE CLINIC | Facility: CLINIC | Age: 55
End: 2020-11-19
Payer: COMMERCIAL

## 2020-11-19 VITALS — TEMPERATURE: 97 F

## 2020-11-19 DIAGNOSIS — B34.9 ACUTE VIRAL SYNDROME: Primary | ICD-10-CM

## 2020-11-19 PROCEDURE — 99213 OFFICE O/P EST LOW 20 MIN: CPT | Performed by: FAMILY MEDICINE

## 2020-11-19 NOTE — PROGRESS NOTES
Video Visit    This visit is conducted using Telemedicine with live, interactive video and audio. Faviola Yesenia  verbally consents to a Video visit.     Patient understands and accepts financial responsibility for any deductible, co-insurance and/or co call [phone] to schedule your Covid test appt.       Johnna Dias MD

## 2020-11-19 NOTE — PATIENT INSTRUCTIONS
Coronavirus Disease 2019 (COVID-19): Overview  Coronavirus disease 2019 (COVID-19) is a respiratory illness. It's caused by a new (novel) coronavirus. There are many types of coronavirus. Coronaviruses are a very common cause of colds and bronchitis.  The · New loss of sense of smell or taste  You can check your symptoms with the CDC’s Coronavirus Self-. What are possible complications from [id number]-[id number]? In many cases, this virus can cause infection (pneumonia) in both lungs.  In some cases, this can ca Your healthcare provider will ask about your symptoms. He or she will ask where you live, and about your recent travel, and any contact with sick people.  If your healthcare provider thinks you may have COVID-19, he or she will consider whether to test you · Imaging tests. You may have a chest X-ray or CT scan. Note about reinfection and your immunity  At this time, it's unclear if people can be reinfected with COVID-19.  The CDC notes that if a person has fully recovered from COVID-19 and is retested [facility] · Prone positioning. Depending on how sick you are during your hospital stay, your healthcare team may turn you regularly on your stomach. This is called prone positioning. It helps increase the amount of oxygen you get to your lungs.  Follow your healthca You are at risk for COVID-19 if you have had close contact with someone with the virus, or if you live in or traveled to an area with cases of it. Close contact means being within 6 feet of a person known to have COVID-19 for a total of 15 minutes or more.

## 2020-11-20 ENCOUNTER — LAB ENCOUNTER (OUTPATIENT)
Dept: LAB | Age: 55
End: 2020-11-20
Attending: FAMILY MEDICINE
Payer: COMMERCIAL

## 2020-11-20 DIAGNOSIS — B34.9 ACUTE VIRAL SYNDROME: ICD-10-CM

## 2020-12-07 ENCOUNTER — TELEPHONE (OUTPATIENT)
Dept: FAMILY MEDICINE CLINIC | Facility: CLINIC | Age: 55
End: 2020-12-07

## 2020-12-07 DIAGNOSIS — J45.20 MILD INTERMITTENT ASTHMA WITHOUT COMPLICATION: ICD-10-CM

## 2020-12-07 DIAGNOSIS — E03.9 ACQUIRED HYPOTHYROIDISM: ICD-10-CM

## 2020-12-07 DIAGNOSIS — J45.31 MILD PERSISTENT ASTHMA WITH ACUTE EXACERBATION: ICD-10-CM

## 2020-12-07 RX ORDER — BUDESONIDE AND FORMOTEROL FUMARATE DIHYDRATE 160; 4.5 UG/1; UG/1
2 AEROSOL RESPIRATORY (INHALATION) 2 TIMES DAILY
Qty: 3 INHALER | Refills: 3 | Status: SHIPPED | OUTPATIENT
Start: 2020-12-07 | End: 2021-12-14

## 2020-12-07 RX ORDER — ALBUTEROL SULFATE 90 UG/1
AEROSOL, METERED RESPIRATORY (INHALATION)
Qty: 3 INHALER | Refills: 1 | Status: SHIPPED | OUTPATIENT
Start: 2020-12-07 | End: 2021-12-14

## 2020-12-07 RX ORDER — LEVOTHYROXINE SODIUM 0.1 MG/1
100 TABLET ORAL
Qty: 90 TABLET | Refills: 3 | Status: SHIPPED | OUTPATIENT
Start: 2020-12-07 | End: 2021-10-25

## 2020-12-07 NOTE — TELEPHONE ENCOUNTER
Pt requesting refill of   Requested Prescriptions     Pending Prescriptions Disp Refills   • Budesonide-Formoterol Fumarate 160-4.5 MCG/ACT Inhalation Aerosol 10.2 Inhaler 3     Sig: Inhale 2 puffs into the lungs 2 (two) times daily.  Rinse after use   • Le

## 2021-02-09 ENCOUNTER — TELEPHONE (OUTPATIENT)
Dept: FAMILY MEDICINE CLINIC | Facility: CLINIC | Age: 56
End: 2021-02-09

## 2021-02-09 NOTE — TELEPHONE ENCOUNTER
Received faxed report from [id number] [facility] screening with [facility]. Date performed 1/28/21. Impression: Category 2 benign. Annual screening mammogram follow-up. Per Tez Calero, DO   Call patient with results.    See scanning

## 2021-02-10 NOTE — TELEPHONE ENCOUNTER
Patient notified of results of mammogram. Patient verbalized understanding.    Will follow-up in 1 year

## 2021-05-03 ENCOUNTER — TELEPHONE (OUTPATIENT)
Dept: FAMILY MEDICINE CLINIC | Facility: CLINIC | Age: 56
End: 2021-05-03

## 2021-05-03 NOTE — TELEPHONE ENCOUNTER
Health maintenance is updated with pt's mammogram from 1/28/21  Also see TE 2/9/21    LM on pt's voicemail letting her know we have the mammogram up to date.    The notification may be for her to schedule her covid-19 or zoster vaccine

## 2021-05-03 NOTE — TELEPHONE ENCOUNTER
Patient said that she had her mammogram done a couple of months ago at [address] [place] [address] getting notifications that she is due for one.

## 2021-10-22 DIAGNOSIS — E03.9 ACQUIRED HYPOTHYROIDISM: ICD-10-CM

## 2021-10-25 ENCOUNTER — TELEPHONE (OUTPATIENT)
Dept: FAMILY MEDICINE CLINIC | Facility: CLINIC | Age: 56
End: 2021-10-25

## 2021-10-25 RX ORDER — LEVOTHYROXINE SODIUM 0.1 MG/1
TABLET ORAL
Qty: 90 TABLET | Refills: 2 | Status: SHIPPED | OUTPATIENT
Start: 2021-10-25 | End: 2022-01-12

## 2021-10-25 NOTE — TELEPHONE ENCOUNTER
Pt has appointment scheduled 12/14/21 for physical, please advise if pt should complete any labs prior to apt   Routed to provider for review

## 2021-10-25 NOTE — TELEPHONE ENCOUNTER
Refill Passed Protocol:     Pt requesting refill of   Requested Prescriptions     Pending Prescriptions Disp Refills   • LEVOTHYROXINE 100 MCG Oral Tab [Pharmacy Med Name: LEVOTHYROXINE 100 MCG TABLET] 90 tablet 2     Sig: TAKE 1 TABLET BY MOUTH EVERY MORN

## 2021-10-26 NOTE — TELEPHONE ENCOUNTER
Patient is due for thyroid labs. Already received refill of medication and if feeling well on current thyroid dose, may receive lab orders for thyroid and fasting labs at annual physical 12/14/2021.     Please inform

## 2021-11-01 NOTE — TELEPHONE ENCOUNTER
Pt returned call, informed of recommendations per Dr. Carmel Vila regarding labs.  Pt voiced understanding

## 2021-12-14 ENCOUNTER — OFFICE VISIT (OUTPATIENT)
Dept: FAMILY MEDICINE CLINIC | Facility: CLINIC | Age: 56
End: 2021-12-14
Payer: COMMERCIAL

## 2021-12-14 VITALS
HEIGHT: 61.81 IN | RESPIRATION RATE: 16 BRPM | SYSTOLIC BLOOD PRESSURE: 118 MMHG | DIASTOLIC BLOOD PRESSURE: 72 MMHG | WEIGHT: 143.38 LBS | BODY MASS INDEX: 26.39 KG/M2 | HEART RATE: 63 BPM | TEMPERATURE: 99 F | OXYGEN SATURATION: 98 %

## 2021-12-14 DIAGNOSIS — Z13.21 SCREENING FOR ENDOCRINE, NUTRITIONAL, METABOLIC AND IMMUNITY DISORDER: ICD-10-CM

## 2021-12-14 DIAGNOSIS — Z13.0 SCREENING FOR ENDOCRINE, NUTRITIONAL, METABOLIC AND IMMUNITY DISORDER: ICD-10-CM

## 2021-12-14 DIAGNOSIS — E78.00 PURE HYPERCHOLESTEROLEMIA: ICD-10-CM

## 2021-12-14 DIAGNOSIS — Z13.228 SCREENING FOR ENDOCRINE, NUTRITIONAL, METABOLIC AND IMMUNITY DISORDER: ICD-10-CM

## 2021-12-14 DIAGNOSIS — Z12.4 SCREENING FOR CERVICAL CANCER: ICD-10-CM

## 2021-12-14 DIAGNOSIS — J45.30 MILD PERSISTENT ASTHMA WITHOUT COMPLICATION: ICD-10-CM

## 2021-12-14 DIAGNOSIS — Z00.00 ANNUAL PHYSICAL EXAM: Primary | ICD-10-CM

## 2021-12-14 DIAGNOSIS — Z12.31 BREAST CANCER SCREENING BY MAMMOGRAM: ICD-10-CM

## 2021-12-14 DIAGNOSIS — E03.9 ACQUIRED HYPOTHYROIDISM: ICD-10-CM

## 2021-12-14 DIAGNOSIS — Z23 NEED FOR VACCINATION: ICD-10-CM

## 2021-12-14 DIAGNOSIS — Z13.29 SCREENING FOR ENDOCRINE, NUTRITIONAL, METABOLIC AND IMMUNITY DISORDER: ICD-10-CM

## 2021-12-14 PROCEDURE — 88175 CYTOPATH C/V AUTO FLUID REDO: CPT | Performed by: FAMILY MEDICINE

## 2021-12-14 PROCEDURE — 3078F DIAST BP <80 MM HG: CPT | Performed by: FAMILY MEDICINE

## 2021-12-14 PROCEDURE — 99396 PREV VISIT EST AGE 40-64: CPT | Performed by: FAMILY MEDICINE

## 2021-12-14 PROCEDURE — 3008F BODY MASS INDEX DOCD: CPT | Performed by: FAMILY MEDICINE

## 2021-12-14 PROCEDURE — 3074F SYST BP LT 130 MM HG: CPT | Performed by: FAMILY MEDICINE

## 2021-12-14 PROCEDURE — 87624 HPV HI-RISK TYP POOLED RSLT: CPT | Performed by: FAMILY MEDICINE

## 2021-12-14 RX ORDER — ALBUTEROL SULFATE 90 UG/1
AEROSOL, METERED RESPIRATORY (INHALATION)
Qty: 18 G | Refills: 0 | Status: SHIPPED | OUTPATIENT
Start: 2021-12-14

## 2021-12-14 RX ORDER — AMOXICILLIN AND CLAVULANATE POTASSIUM 500; 125 MG/1; MG/1
TABLET, FILM COATED ORAL
COMMUNITY
Start: 2021-11-15 | End: 2021-12-14

## 2021-12-14 RX ORDER — CYCLOBENZAPRINE HCL 10 MG
TABLET ORAL
COMMUNITY
Start: 2021-12-08

## 2021-12-14 RX ORDER — BUDESONIDE AND FORMOTEROL FUMARATE DIHYDRATE 160; 4.5 UG/1; UG/1
2 AEROSOL RESPIRATORY (INHALATION) 2 TIMES DAILY
Qty: 30.6 G | Refills: 3 | Status: SHIPPED | OUTPATIENT
Start: 2021-12-14

## 2021-12-14 NOTE — PROGRESS NOTES
REASON FOR VISIT:    Alton Jensen is a 61year old female who presents for an [address]. Asthma-using Symbicort 1 puff daily and feels not controlled. Using albuterol inhaler a few times a week with relief.  Occasional symptoms in pm. 6.4 oz (74.1 kg)  02/27/19 : 164 lb (74.4 kg)  01/29/19 : 164 lb (74.4 kg)  10/11/18 : 164 lb (74.4 kg)      Patient Active Problem List:     GERD (gastroesophageal reflux disease)     Family history of esophageal cancer     Family history of colon cancer AST 17 10/31/2020    AST 15 02/05/2019    AST 16 06/21/2016     Lab Results   Component Value Date    ALT 15 10/31/2020    ALT 22 02/05/2019    ALT 24 06/21/2016     Lab Results   Component Value Date    TSH 2.06 10/31/2020    TSH 2.950 02/05/2019    TS 30-65 Pap Smear,5 Years due on 06/21/2021    Chlamydia Screening Screen Annually age<25, if sex active/on OCPs; >24 high risk No results found for: CHLAMYDIA    Colonoscopy Screen Every 10 years Colonoscopy due on 01/10/2023    Flex Sigmoidoscopy Screen  E Creat/alb ratio  Annually CREATININE (mg/dL)   Date Value   10/31/2020 0.84        LDL  Annually LDL-CHOLESTEROL (mg/dL (calc))   Date Value   10/31/2020 156 (H)    No flowsheet data found. Dilated Eye exam  Annually No flowsheet data found.   No flowsh prescence   • EGD  4/18/16    Dreyer/ronaldo   • DUSTIN LOCALIZATION WIRE 1 SITE LEFT (CPT=19281) Left 4-2015    BENIGN   • OTHER SURGICAL HISTORY      cosmetic surgery of the face    • REDUCTION LEFT  2007    BREAST LIFT   • REDUCTION RIGHT Right 2007    B no adenopathy, no bruits  CHEST: no chest tenderness  BREAST: no dominant or suspicious mass  LUNGS: clear to auscultation  CARDIO: RRR without murmur  GI: good BS's, no masses, HSM or tenderness  : introitus is normal, scant discharge, cervix is pink, n times daily.  Rinse after use  Dispense: 30.6 g; Refill: 3  - albuterol (VENTOLIN HFA) 108 (90 Base) MCG/ACT Inhalation Aero Soln; INHALE 2 PUFFS BY MOUTH EVERY 4 HOURS AS NEEDED FOR SHORTNESS OF BREATH OR WHEEZING  Dispense: 18 g; Refill: 0  Continue singu

## 2021-12-15 PROBLEM — L53.9 REDNESS OF SKIN: Status: RESOLVED | Noted: 2018-06-18 | Resolved: 2021-12-15

## 2021-12-15 PROBLEM — N61.0 ACUTE MASTITIS OF RIGHT BREAST: Status: RESOLVED | Noted: 2018-06-18 | Resolved: 2021-12-15

## 2021-12-15 NOTE — PATIENT INSTRUCTIONS
Perform labs fasting 8 hours with water or black coffee or or black tea diet  soda only prior to exam.    -Encourage healthy diet of whole food and avoid processed food and sugary drinks and sodas.   Diet should include lean meats and vegetables including 5 exercise? Exercising regularly offers many healthy rewards.  It can help you do all of the following:  · Improve your blood cholesterol level to help prevent further heart trouble  · Lower your blood pressure to help prevent a stroke or heart attack  · Con substitute for professional medical care. Always follow your healthcare professional's instructions. Eating Heart-Healthy Foods  Eating has a big impact on your heart health. In fact, eating healthier can improve several of your heart risks at once.  Trista Frost keep a diary to record what you eat and how often you exercise. Choose the right foods  Aim to make these foods staples of your diet.  If you have diabetes, you may have different recommendations than what is listed here:  · Fruits and vegetables provide p spice up your food without adding calories, fat, or sodium. Try these items: horseradish, hot sauce, lemon, mustard, nonfat salad dressings, and vinegar. For salt-free herbs and spices, try basil, cilantro, cinnamon, pepper, and rosemary.   Date Last Review increased risk for fractures. With age, the quality and quantity of bone declines. You can lessen bone loss by staying active and increasing your calcium intake. Calcium supplements and other osteoporosis treatments do have risks.  So talk with your healthc may vary depending on brand and size.   Daily calcium needs  15to 25years old: 1,300 mg  23to 27years old: 1,000 mg  32to 48years old: 1,000 mg  46to 79years old, women: 1,200 mg  46to 79years old, men: 1,000 mg  Pregnant or nursin to 18 year disease  · Have dark skin pigmentation  · Being a  baby  Vitamin D has many effects in the body.  You may need this test to help your healthcare provider diagnose or treat:  · Problems with the parathyroid gland  · Cancer  · Autoimmune diseases, [place] vitamin D in supplement form can affect your vitamin D levels. Ask your healthcare provider if any health conditions you have or medicines you take could affect your results.   How do I get ready for this test?  Tell your healthcare provider if you take vit [address]. [address] [facility], [place], [address] [place] [address]. All rights reserved. This information is not intended as a substitute for professional medical care. Always follow your healthcare professional's instructions.           Living with [facility]

## 2022-01-11 LAB
ABSOLUTE BASOPHILS: 102 CELLS/UL (ref 0–200)
ABSOLUTE EOSINOPHILS: 510 CELLS/UL (ref 15–500)
ABSOLUTE LYMPHOCYTES: 2442 CELLS/UL (ref 850–3900)
ABSOLUTE MONOCYTES: 432 CELLS/UL (ref 200–950)
ABSOLUTE NEUTROPHILS: 2514 CELLS/UL (ref 1500–7800)
ALBUMIN/GLOBULIN RATIO: 1.8 (CALC) (ref 1–2.5)
ALBUMIN: 4.6 G/DL (ref 3.6–5.1)
ALKALINE PHOSPHATASE: 47 U/L (ref 37–153)
ALT: 19 U/L (ref 6–29)
AST: 19 U/L (ref 10–35)
BASOPHILS: 1.7 %
BILIRUBIN, TOTAL: 0.4 MG/DL (ref 0.2–1.2)
BUN: 14 MG/DL (ref 7–25)
CALCIUM: 9.2 MG/DL (ref 8.6–10.4)
CARBON DIOXIDE: 29 MMOL/L (ref 20–32)
CHLORIDE: 106 MMOL/L (ref 98–110)
CHOL/HDLC RATIO: 2.6 (CALC)
CHOLESTEROL, TOTAL: 216 MG/DL
CREATININE: 0.78 MG/DL (ref 0.5–1.05)
EGFR IF AFRICN AM: 98 ML/MIN/1.73M2
EGFR IF NONAFRICN AM: 84 ML/MIN/1.73M2
EOSINOPHILS: 8.5 %
GLOBULIN: 2.6 G/DL (CALC) (ref 1.9–3.7)
GLUCOSE: 86 MG/DL (ref 65–99)
HDL CHOLESTEROL: 83 MG/DL
HEMATOCRIT: 40.8 % (ref 35–45)
HEMOGLOBIN: 13.5 G/DL (ref 11.7–15.5)
LDL-CHOLESTEROL: 121 MG/DL (CALC)
LYMPHOCYTES: 40.7 %
MCH: 28.4 PG (ref 27–33)
MCHC: 33.1 G/DL (ref 32–36)
MCV: 85.7 FL (ref 80–100)
MONOCYTES: 7.2 %
MPV: 10.6 FL (ref 7.5–12.5)
NEUTROPHILS: 41.9 %
NON-HDL CHOLESTEROL: 133 MG/DL (CALC)
PLATELET COUNT: 301 THOUSAND/UL (ref 140–400)
POTASSIUM: 4.2 MMOL/L (ref 3.5–5.3)
PROTEIN, TOTAL: 7.2 G/DL (ref 6.1–8.1)
RDW: 12.7 % (ref 11–15)
RED BLOOD CELL COUNT: 4.76 MILLION/UL (ref 3.8–5.1)
SODIUM: 142 MMOL/L (ref 135–146)
T4, FREE: 1.5 NG/DL (ref 0.8–1.8)
TRIGLYCERIDES: 39 MG/DL
TSH: 1.78 MIU/L (ref 0.4–4.5)
WHITE BLOOD CELL COUNT: 6 THOUSAND/UL (ref 3.8–10.8)

## 2022-01-12 NOTE — PROGRESS NOTES
Lavern notified:  See my chart message to patient. The patient's ASCVD 10 year risk score is 1.6. Dear Juline Bamberger,  Your labs are normal except your lipid panel is elevated. Thyroid is well controlled.   Please continue levothyroxine at current d

## 2022-11-01 ENCOUNTER — OFFICE VISIT (OUTPATIENT)
Dept: FAMILY MEDICINE CLINIC | Facility: CLINIC | Age: 57
End: 2022-11-01
Payer: COMMERCIAL

## 2022-11-01 VITALS
BODY MASS INDEX: 28.25 KG/M2 | SYSTOLIC BLOOD PRESSURE: 128 MMHG | TEMPERATURE: 98 F | WEIGHT: 149.63 LBS | RESPIRATION RATE: 18 BRPM | HEIGHT: 61 IN | DIASTOLIC BLOOD PRESSURE: 70 MMHG | HEART RATE: 79 BPM | OXYGEN SATURATION: 98 %

## 2022-11-01 DIAGNOSIS — Z86.010 HISTORY OF ADENOMATOUS POLYP OF COLON: ICD-10-CM

## 2022-11-01 DIAGNOSIS — L98.9 SKIN LESION OF FACE: ICD-10-CM

## 2022-11-01 DIAGNOSIS — Z85.828 HISTORY OF BASAL CELL CARCINOMA (BCC) EXCISION: Primary | ICD-10-CM

## 2022-11-01 DIAGNOSIS — Z98.890 HISTORY OF BASAL CELL CARCINOMA (BCC) EXCISION: Primary | ICD-10-CM

## 2022-11-01 PROCEDURE — 3074F SYST BP LT 130 MM HG: CPT | Performed by: FAMILY MEDICINE

## 2022-11-01 PROCEDURE — 99213 OFFICE O/P EST LOW 20 MIN: CPT | Performed by: FAMILY MEDICINE

## 2022-11-01 PROCEDURE — 3078F DIAST BP <80 MM HG: CPT | Performed by: FAMILY MEDICINE

## 2022-11-01 PROCEDURE — 3008F BODY MASS INDEX DOCD: CPT | Performed by: FAMILY MEDICINE

## 2022-11-02 ENCOUNTER — TELEPHONE (OUTPATIENT)
Dept: GASTROENTEROLOGY | Age: 57
End: 2022-11-02

## 2022-11-02 DIAGNOSIS — Z86.010 PERSONAL HISTORY OF COLONIC POLYPS: Primary | ICD-10-CM

## 2022-11-02 RX ORDER — BISACODYL 5 MG/1
TABLET, DELAYED RELEASE ORAL
Qty: 2 TABLET | Refills: 0 | Status: SHIPPED | OUTPATIENT
Start: 2022-11-02 | End: 2022-12-17

## 2022-11-02 RX ORDER — SIMETHICONE 125 MG
TABLET,CHEWABLE ORAL
Qty: 2 TABLET | Refills: 0 | Status: SHIPPED | OUTPATIENT
Start: 2022-11-02 | End: 2022-12-17

## 2022-11-06 PROBLEM — Z86.0101 HISTORY OF ADENOMATOUS POLYP OF COLON: Status: ACTIVE | Noted: 2022-11-06

## 2022-11-06 PROBLEM — Z98.890 HISTORY OF BASAL CELL CARCINOMA (BCC) EXCISION: Status: ACTIVE | Noted: 2022-11-06

## 2022-11-06 PROBLEM — L98.9 SKIN LESION OF FACE: Status: ACTIVE | Noted: 2022-11-06

## 2022-11-06 PROBLEM — Z86.010 HISTORY OF ADENOMATOUS POLYP OF COLON: Status: ACTIVE | Noted: 2022-11-06

## 2022-11-06 PROBLEM — Z85.828 HISTORY OF BASAL CELL CARCINOMA (BCC) EXCISION: Status: ACTIVE | Noted: 2022-11-06

## 2022-12-16 ENCOUNTER — OFFICE VISIT (OUTPATIENT)
Dept: FAMILY MEDICINE CLINIC | Facility: CLINIC | Age: 57
End: 2022-12-16
Payer: COMMERCIAL

## 2022-12-16 VITALS
OXYGEN SATURATION: 96 % | RESPIRATION RATE: 18 BRPM | HEART RATE: 61 BPM | TEMPERATURE: 98 F | HEIGHT: 61 IN | BODY MASS INDEX: 27.19 KG/M2 | WEIGHT: 144 LBS | SYSTOLIC BLOOD PRESSURE: 116 MMHG | DIASTOLIC BLOOD PRESSURE: 82 MMHG

## 2022-12-16 DIAGNOSIS — J45.30 MILD PERSISTENT ASTHMA WITHOUT COMPLICATION: ICD-10-CM

## 2022-12-16 DIAGNOSIS — E03.9 ACQUIRED HYPOTHYROIDISM: ICD-10-CM

## 2022-12-16 DIAGNOSIS — Z00.00 ANNUAL PHYSICAL EXAM: Primary | ICD-10-CM

## 2022-12-16 DIAGNOSIS — Z80.0 FAMILY HISTORY OF COLON CANCER: ICD-10-CM

## 2022-12-16 DIAGNOSIS — Z13.0 SCREENING FOR ENDOCRINE, NUTRITIONAL, METABOLIC AND IMMUNITY DISORDER: ICD-10-CM

## 2022-12-16 DIAGNOSIS — Z12.31 BREAST CANCER SCREENING BY MAMMOGRAM: ICD-10-CM

## 2022-12-16 DIAGNOSIS — Z13.21 SCREENING FOR ENDOCRINE, NUTRITIONAL, METABOLIC AND IMMUNITY DISORDER: ICD-10-CM

## 2022-12-16 DIAGNOSIS — Z12.11 SCREEN FOR COLON CANCER: ICD-10-CM

## 2022-12-16 DIAGNOSIS — Z13.228 SCREENING FOR ENDOCRINE, NUTRITIONAL, METABOLIC AND IMMUNITY DISORDER: ICD-10-CM

## 2022-12-16 DIAGNOSIS — Z23 NEED FOR VACCINATION: ICD-10-CM

## 2022-12-16 DIAGNOSIS — E78.00 PURE HYPERCHOLESTEROLEMIA: ICD-10-CM

## 2022-12-16 DIAGNOSIS — Z13.29 SCREENING FOR ENDOCRINE, NUTRITIONAL, METABOLIC AND IMMUNITY DISORDER: ICD-10-CM

## 2022-12-16 PROCEDURE — 3008F BODY MASS INDEX DOCD: CPT | Performed by: FAMILY MEDICINE

## 2022-12-16 PROCEDURE — 3074F SYST BP LT 130 MM HG: CPT | Performed by: FAMILY MEDICINE

## 2022-12-16 PROCEDURE — 90677 PCV20 VACCINE IM: CPT | Performed by: FAMILY MEDICINE

## 2022-12-16 PROCEDURE — 90471 IMMUNIZATION ADMIN: CPT | Performed by: FAMILY MEDICINE

## 2022-12-16 PROCEDURE — 99396 PREV VISIT EST AGE 40-64: CPT | Performed by: FAMILY MEDICINE

## 2022-12-16 PROCEDURE — 3079F DIAST BP 80-89 MM HG: CPT | Performed by: FAMILY MEDICINE

## 2022-12-16 RX ORDER — POLYETHYLENE GLYCOL-3350 AND ELECTROLYTES 236; 6.74; 5.86; 2.97; 22.74 G/274.31G; G/274.31G; G/274.31G; G/274.31G; G/274.31G
POWDER, FOR SOLUTION ORAL AS DIRECTED
COMMUNITY
Start: 2022-11-02

## 2022-12-16 RX ORDER — LEVOTHYROXINE SODIUM 0.1 MG/1
100 TABLET ORAL
Qty: 90 TABLET | Refills: 3 | Status: SHIPPED | OUTPATIENT
Start: 2022-12-16

## 2022-12-16 RX ORDER — BISACODYL 5 MG
TABLET, DELAYED RELEASE (ENTERIC COATED) ORAL AS DIRECTED
COMMUNITY
Start: 2022-11-02

## 2022-12-16 RX ORDER — SIMETHICONE 125 MG
TABLET,CHEWABLE ORAL AS DIRECTED
COMMUNITY
Start: 2022-11-02

## 2022-12-18 RX ORDER — BUDESONIDE AND FORMOTEROL FUMARATE DIHYDRATE 160; 4.5 UG/1; UG/1
2 AEROSOL RESPIRATORY (INHALATION) 2 TIMES DAILY
Qty: 30.6 G | Refills: 3 | Status: SHIPPED | OUTPATIENT
Start: 2022-12-18

## 2022-12-18 RX ORDER — ALBUTEROL SULFATE 90 UG/1
AEROSOL, METERED RESPIRATORY (INHALATION)
Qty: 18 G | Refills: 1 | Status: SHIPPED | OUTPATIENT
Start: 2022-12-18

## 2022-12-31 LAB
ABSOLUTE BASOPHILS: 81 CELLS/UL (ref 0–200)
ABSOLUTE EOSINOPHILS: 222 CELLS/UL (ref 15–500)
ABSOLUTE LYMPHOCYTES: 2375 CELLS/UL (ref 850–3900)
ABSOLUTE MONOCYTES: 370 CELLS/UL (ref 200–950)
ABSOLUTE NEUTROPHILS: 4351 CELLS/UL (ref 1500–7800)
ALBUMIN/GLOBULIN RATIO: 1.7 (CALC) (ref 1–2.5)
ALBUMIN: 4.4 G/DL (ref 3.6–5.1)
ALKALINE PHOSPHATASE: 55 U/L (ref 37–153)
ALT: 16 U/L (ref 6–29)
AST: 17 U/L (ref 10–35)
BASOPHILS: 1.1 %
BILIRUBIN, TOTAL: 0.5 MG/DL (ref 0.2–1.2)
BUN: 11 MG/DL (ref 7–25)
CALCIUM: 9.7 MG/DL (ref 8.6–10.4)
CARBON DIOXIDE: 31 MMOL/L (ref 20–32)
CHLORIDE: 101 MMOL/L (ref 98–110)
CHOL/HDLC RATIO: 2.8 (CALC)
CHOLESTEROL, TOTAL: 233 MG/DL
CREATININE: 0.64 MG/DL (ref 0.5–1.03)
EGFR: 102 ML/MIN/1.73M2
EOSINOPHILS: 3 %
GLOBULIN: 2.6 G/DL (CALC) (ref 1.9–3.7)
GLUCOSE: 89 MG/DL (ref 65–99)
HDL CHOLESTEROL: 83 MG/DL
HEMATOCRIT: 41.3 % (ref 35–45)
HEMOGLOBIN: 13.6 G/DL (ref 11.7–15.5)
LDL-CHOLESTEROL: 134 MG/DL (CALC)
LYMPHOCYTES: 32.1 %
MCH: 28.7 PG (ref 27–33)
MCHC: 32.9 G/DL (ref 32–36)
MCV: 87.1 FL (ref 80–100)
MONOCYTES: 5 %
MPV: 10.5 FL (ref 7.5–12.5)
NEUTROPHILS: 58.8 %
NON-HDL CHOLESTEROL: 150 MG/DL (CALC)
PLATELET COUNT: 309 THOUSAND/UL (ref 140–400)
POTASSIUM: 4.3 MMOL/L (ref 3.5–5.3)
PROTEIN, TOTAL: 7 G/DL (ref 6.1–8.1)
RDW: 12.4 % (ref 11–15)
RED BLOOD CELL COUNT: 4.74 MILLION/UL (ref 3.8–5.1)
SODIUM: 138 MMOL/L (ref 135–146)
T4, FREE: 1.4 NG/DL (ref 0.8–1.8)
TRIGLYCERIDES: 72 MG/DL
TSH: 0.95 MIU/L (ref 0.4–4.5)
WHITE BLOOD CELL COUNT: 7.4 THOUSAND/UL (ref 3.8–10.8)

## 2023-02-14 ENCOUNTER — TELEPHONE (OUTPATIENT)
Dept: GASTROENTEROLOGY | Age: 58
End: 2023-02-14

## 2023-02-17 RX ORDER — SIMETHICONE 125 MG
TABLET,CHEWABLE ORAL
Qty: 2 TABLET | Refills: 0 | Status: SHIPPED | OUTPATIENT
Start: 2023-02-17 | End: 2023-04-03

## 2023-02-17 RX ORDER — BISACODYL 5 MG/1
TABLET, DELAYED RELEASE ORAL
Qty: 2 TABLET | Refills: 0 | Status: SHIPPED | OUTPATIENT
Start: 2023-02-17 | End: 2023-04-03

## 2023-03-08 ENCOUNTER — PATIENT MESSAGE (OUTPATIENT)
Dept: FAMILY MEDICINE CLINIC | Facility: CLINIC | Age: 58
End: 2023-03-08

## 2023-03-09 NOTE — TELEPHONE ENCOUNTER
Spoke to [facility] with Dr. Petra Robert' office to request a sooner available appt with dermatologist due to hx of basal cell carcinoma. Message will be forwarded to clinical staff and they will contact patient to arrange a sooner appt.

## 2023-03-21 ENCOUNTER — TELEPHONE (OUTPATIENT)
Dept: GASTROENTEROLOGY | Age: 58
End: 2023-03-21

## 2023-03-24 ENCOUNTER — ANESTHESIA (OUTPATIENT)
Dept: GASTROENTEROLOGY | Age: 58
End: 2023-03-24

## 2023-03-24 ENCOUNTER — ANESTHESIA EVENT (OUTPATIENT)
Dept: GASTROENTEROLOGY | Age: 58
End: 2023-03-24

## 2023-03-24 ENCOUNTER — HOSPITAL ENCOUNTER (OUTPATIENT)
Dept: GASTROENTEROLOGY | Age: 58
Discharge: HOME | End: 2023-03-24
Attending: INTERNAL MEDICINE

## 2023-03-24 VITALS
HEIGHT: 63 IN | BODY MASS INDEX: 25.34 KG/M2 | TEMPERATURE: 97.1 F | RESPIRATION RATE: 18 BRPM | WEIGHT: 143 LBS | DIASTOLIC BLOOD PRESSURE: 75 MMHG | HEART RATE: 71 BPM | OXYGEN SATURATION: 100 % | SYSTOLIC BLOOD PRESSURE: 124 MMHG

## 2023-03-24 DIAGNOSIS — Z86.010 PERSONAL HISTORY OF COLONIC POLYPS: ICD-10-CM

## 2023-03-24 DIAGNOSIS — K63.5 POLYP OF COLON, UNSPECIFIED PART OF COLON, UNSPECIFIED TYPE: ICD-10-CM

## 2023-03-24 LAB — COLONOSCOPY STUDY: NORMAL

## 2023-03-24 PROCEDURE — 45385 COLONOSCOPY W/LESION REMOVAL: CPT | Performed by: CLINIC/CENTER

## 2023-03-24 PROCEDURE — 45380 COLONOSCOPY AND BIOPSY: CPT | Performed by: CLINIC/CENTER

## 2023-03-24 PROCEDURE — 45385 COLONOSCOPY W/LESION REMOVAL: CPT | Performed by: INTERNAL MEDICINE

## 2023-03-24 PROCEDURE — 88305 TISSUE EXAM BY PATHOLOGIST: CPT | Performed by: INTERNAL MEDICINE

## 2023-03-24 PROCEDURE — 45380 COLONOSCOPY AND BIOPSY: CPT | Performed by: INTERNAL MEDICINE

## 2023-03-24 RX ORDER — LIDOCAINE HYDROCHLORIDE 10 MG/ML
5-10 INJECTION, SOLUTION EPIDURAL; INFILTRATION; INTRACAUDAL; PERINEURAL PRN
Status: DISCONTINUED | OUTPATIENT
Start: 2023-03-24 | End: 2023-03-26 | Stop reason: HOSPADM

## 2023-03-24 RX ORDER — SODIUM CHLORIDE 9 MG/ML
INJECTION, SOLUTION INTRAVENOUS CONTINUOUS
Status: DISCONTINUED | OUTPATIENT
Start: 2023-03-24 | End: 2023-03-26 | Stop reason: HOSPADM

## 2023-03-24 RX ORDER — DEXTROSE MONOHYDRATE 50 MG/ML
INJECTION, SOLUTION INTRAVENOUS CONTINUOUS PRN
Status: DISCONTINUED | OUTPATIENT
Start: 2023-03-24 | End: 2023-03-26 | Stop reason: HOSPADM

## 2023-03-24 RX ORDER — SODIUM CHLORIDE, SODIUM LACTATE, POTASSIUM CHLORIDE, CALCIUM CHLORIDE 600; 310; 30; 20 MG/100ML; MG/100ML; MG/100ML; MG/100ML
INJECTION, SOLUTION INTRAVENOUS CONTINUOUS
Status: DISCONTINUED | OUTPATIENT
Start: 2023-03-24 | End: 2023-03-26 | Stop reason: HOSPADM

## 2023-03-24 RX ORDER — PROPOFOL 10 MG/ML
INJECTION, EMULSION INTRAVENOUS PRN
Status: DISCONTINUED | OUTPATIENT
Start: 2023-03-24 | End: 2023-03-24

## 2023-03-24 RX ORDER — LIDOCAINE HYDROCHLORIDE 10 MG/ML
INJECTION, SOLUTION INFILTRATION; PERINEURAL PRN
Status: DISCONTINUED | OUTPATIENT
Start: 2023-03-24 | End: 2023-03-24

## 2023-03-24 RX ADMIN — PROPOFOL 20 MG: 10 INJECTION, EMULSION INTRAVENOUS at 08:09

## 2023-03-24 RX ADMIN — PROPOFOL 20 MG: 10 INJECTION, EMULSION INTRAVENOUS at 08:00

## 2023-03-24 RX ADMIN — PROPOFOL 20 MG: 10 INJECTION, EMULSION INTRAVENOUS at 08:01

## 2023-03-24 RX ADMIN — PROPOFOL 20 MG: 10 INJECTION, EMULSION INTRAVENOUS at 07:59

## 2023-03-24 RX ADMIN — PROPOFOL 20 MG: 10 INJECTION, EMULSION INTRAVENOUS at 07:53

## 2023-03-24 RX ADMIN — PROPOFOL 20 MG: 10 INJECTION, EMULSION INTRAVENOUS at 08:13

## 2023-03-24 RX ADMIN — PROPOFOL 20 MG: 10 INJECTION, EMULSION INTRAVENOUS at 08:04

## 2023-03-24 RX ADMIN — PROPOFOL 20 MG: 10 INJECTION, EMULSION INTRAVENOUS at 07:52

## 2023-03-24 RX ADMIN — PROPOFOL 20 MG: 10 INJECTION, EMULSION INTRAVENOUS at 08:11

## 2023-03-24 RX ADMIN — PROPOFOL 20 MG: 10 INJECTION, EMULSION INTRAVENOUS at 07:49

## 2023-03-24 RX ADMIN — PROPOFOL 50 MG: 10 INJECTION, EMULSION INTRAVENOUS at 07:44

## 2023-03-24 RX ADMIN — PROPOFOL 40 MG: 10 INJECTION, EMULSION INTRAVENOUS at 07:45

## 2023-03-24 RX ADMIN — PROPOFOL 20 MG: 10 INJECTION, EMULSION INTRAVENOUS at 07:48

## 2023-03-24 RX ADMIN — PROPOFOL 20 MG: 10 INJECTION, EMULSION INTRAVENOUS at 08:03

## 2023-03-24 RX ADMIN — PROPOFOL 20 MG: 10 INJECTION, EMULSION INTRAVENOUS at 08:19

## 2023-03-24 RX ADMIN — PROPOFOL 20 MG: 10 INJECTION, EMULSION INTRAVENOUS at 07:51

## 2023-03-24 RX ADMIN — PROPOFOL 20 MG: 10 INJECTION, EMULSION INTRAVENOUS at 08:08

## 2023-03-24 RX ADMIN — PROPOFOL 20 MG: 10 INJECTION, EMULSION INTRAVENOUS at 08:12

## 2023-03-24 RX ADMIN — PROPOFOL 20 MG: 10 INJECTION, EMULSION INTRAVENOUS at 07:47

## 2023-03-24 RX ADMIN — PROPOFOL 20 MG: 10 INJECTION, EMULSION INTRAVENOUS at 08:05

## 2023-03-24 RX ADMIN — PROPOFOL 20 MG: 10 INJECTION, EMULSION INTRAVENOUS at 08:07

## 2023-03-24 RX ADMIN — PROPOFOL 20 MG: 10 INJECTION, EMULSION INTRAVENOUS at 08:20

## 2023-03-24 RX ADMIN — SODIUM CHLORIDE, SODIUM LACTATE, POTASSIUM CHLORIDE, CALCIUM CHLORIDE: 600; 310; 30; 20 INJECTION, SOLUTION INTRAVENOUS at 07:10

## 2023-03-24 RX ADMIN — PROPOFOL 20 MG: 10 INJECTION, EMULSION INTRAVENOUS at 08:16

## 2023-03-24 RX ADMIN — PROPOFOL 20 MG: 10 INJECTION, EMULSION INTRAVENOUS at 07:57

## 2023-03-24 RX ADMIN — PROPOFOL 20 MG: 10 INJECTION, EMULSION INTRAVENOUS at 08:17

## 2023-03-24 RX ADMIN — PROPOFOL 20 MG: 10 INJECTION, EMULSION INTRAVENOUS at 08:15

## 2023-03-24 RX ADMIN — PROPOFOL 20 MG: 10 INJECTION, EMULSION INTRAVENOUS at 08:21

## 2023-03-24 RX ADMIN — PROPOFOL 30 MG: 10 INJECTION, EMULSION INTRAVENOUS at 07:46

## 2023-03-24 RX ADMIN — PROPOFOL 20 MG: 10 INJECTION, EMULSION INTRAVENOUS at 07:56

## 2023-03-24 RX ADMIN — LIDOCAINE HYDROCHLORIDE 40 MG: 10 INJECTION, SOLUTION INFILTRATION; PERINEURAL at 07:44

## 2023-03-24 RX ADMIN — PROPOFOL 20 MG: 10 INJECTION, EMULSION INTRAVENOUS at 07:55

## 2023-03-24 ASSESSMENT — ACTIVITIES OF DAILY LIVING (ADL)
ADL_SCORE: 12
NEEDS_ASSIST: NO
ADL_BEFORE_ADMISSION: INDEPENDENT
ADL_SHORT_OF_BREATH: NO

## 2023-03-24 ASSESSMENT — PAIN SCALES - GENERAL: PAINLEVEL_OUTOF10: 0

## 2023-03-27 ENCOUNTER — TELEPHONE (OUTPATIENT)
Dept: FAMILY MEDICINE CLINIC | Facility: CLINIC | Age: 58
End: 2023-03-27

## 2023-03-27 ENCOUNTER — TELEPHONE (OUTPATIENT)
Dept: GASTROENTEROLOGY | Age: 58
End: 2023-03-27

## 2023-03-27 NOTE — TELEPHONE ENCOUNTER
Pt states that her pharmacy does not have a Rx levothyroxine. Confirmed that pharmacy has medication and refills on file. Pt informed.

## 2023-03-27 NOTE — TELEPHONE ENCOUNTER
Connected office of DR Hillman Peers requesting colonoscopy report.      Spoke with [address]       Ph. [phone]

## 2023-03-28 LAB
ASR DISCLAIMER: NORMAL
CASE RPRT: NORMAL
CLINICAL INFO: NORMAL
PATH REPORT.FINAL DX SPEC: NORMAL
PATH REPORT.GROSS SPEC: NORMAL

## 2023-03-30 ENCOUNTER — CLINICAL DOCUMENTATION (OUTPATIENT)
Dept: GASTROENTEROLOGY | Age: 58
End: 2023-03-30

## 2023-05-13 ENCOUNTER — MED REC SCAN ONLY (OUTPATIENT)
Dept: FAMILY MEDICINE CLINIC | Facility: CLINIC | Age: 58
End: 2023-05-13

## 2023-06-30 DIAGNOSIS — J45.30 MILD PERSISTENT ASTHMA WITHOUT COMPLICATION: ICD-10-CM

## 2023-06-30 RX ORDER — ALBUTEROL SULFATE 90 UG/1
AEROSOL, METERED RESPIRATORY (INHALATION)
Qty: 8.5 G | Refills: 0 | Status: SHIPPED | OUTPATIENT
Start: 2023-06-30

## 2023-09-12 DIAGNOSIS — J45.30 MILD PERSISTENT ASTHMA WITHOUT COMPLICATION: ICD-10-CM

## 2023-09-13 RX ORDER — ALBUTEROL SULFATE 90 UG/1
AEROSOL, METERED RESPIRATORY (INHALATION)
Qty: 8.5 G | Refills: 0 | Status: SHIPPED | OUTPATIENT
Start: 2023-09-13

## 2023-10-03 ENCOUNTER — PATIENT MESSAGE (OUTPATIENT)
Dept: FAMILY MEDICINE CLINIC | Facility: CLINIC | Age: 58
End: 2023-10-03

## 2023-12-19 ENCOUNTER — OFFICE VISIT (OUTPATIENT)
Dept: FAMILY MEDICINE CLINIC | Facility: CLINIC | Age: 58
End: 2023-12-19
Payer: COMMERCIAL

## 2023-12-19 VITALS
HEIGHT: 61.9 IN | TEMPERATURE: 98 F | RESPIRATION RATE: 18 BRPM | DIASTOLIC BLOOD PRESSURE: 76 MMHG | OXYGEN SATURATION: 98 % | SYSTOLIC BLOOD PRESSURE: 122 MMHG | BODY MASS INDEX: 28.31 KG/M2 | HEART RATE: 75 BPM | WEIGHT: 153.81 LBS

## 2023-12-19 DIAGNOSIS — Z13.0 SCREENING FOR ENDOCRINE, NUTRITIONAL, METABOLIC AND IMMUNITY DISORDER: ICD-10-CM

## 2023-12-19 DIAGNOSIS — E78.00 PURE HYPERCHOLESTEROLEMIA: ICD-10-CM

## 2023-12-19 DIAGNOSIS — Z00.00 ANNUAL PHYSICAL EXAM: Primary | ICD-10-CM

## 2023-12-19 DIAGNOSIS — Z13.29 SCREENING FOR ENDOCRINE, NUTRITIONAL, METABOLIC AND IMMUNITY DISORDER: ICD-10-CM

## 2023-12-19 DIAGNOSIS — Z13.228 SCREENING FOR ENDOCRINE, NUTRITIONAL, METABOLIC AND IMMUNITY DISORDER: ICD-10-CM

## 2023-12-19 DIAGNOSIS — J45.30 MILD PERSISTENT ASTHMA WITHOUT COMPLICATION: ICD-10-CM

## 2023-12-19 DIAGNOSIS — C44.319 BASAL CELL CARCINOMA (BCC) OF SKIN OF OTHER PART OF FACE: ICD-10-CM

## 2023-12-19 DIAGNOSIS — Z13.21 SCREENING FOR ENDOCRINE, NUTRITIONAL, METABOLIC AND IMMUNITY DISORDER: ICD-10-CM

## 2023-12-19 DIAGNOSIS — E03.9 ACQUIRED HYPOTHYROIDISM: ICD-10-CM

## 2023-12-19 DIAGNOSIS — Z12.31 ENCOUNTER FOR SCREENING MAMMOGRAM FOR MALIGNANT NEOPLASM OF BREAST: ICD-10-CM

## 2023-12-19 PROBLEM — R93.1 ABNORMAL CT SCAN OF HEART: Status: ACTIVE | Noted: 2023-12-19

## 2023-12-19 PROBLEM — N63.10 BREAST MASS, RIGHT: Status: RESOLVED | Noted: 2018-06-18 | Resolved: 2023-12-19

## 2023-12-19 PROCEDURE — 3008F BODY MASS INDEX DOCD: CPT | Performed by: FAMILY MEDICINE

## 2023-12-19 PROCEDURE — 3078F DIAST BP <80 MM HG: CPT | Performed by: FAMILY MEDICINE

## 2023-12-19 PROCEDURE — 99396 PREV VISIT EST AGE 40-64: CPT | Performed by: FAMILY MEDICINE

## 2023-12-19 PROCEDURE — 3074F SYST BP LT 130 MM HG: CPT | Performed by: FAMILY MEDICINE

## 2023-12-19 RX ORDER — BUDESONIDE AND FORMOTEROL FUMARATE DIHYDRATE 160; 4.5 UG/1; UG/1
2 AEROSOL RESPIRATORY (INHALATION) 2 TIMES DAILY
Qty: 30.6 G | Refills: 3 | Status: SHIPPED | OUTPATIENT
Start: 2023-12-19

## 2023-12-19 RX ORDER — ROSUVASTATIN CALCIUM 10 MG/1
TABLET, COATED ORAL
COMMUNITY
Start: 2023-09-26 | End: 2023-12-19

## 2023-12-19 RX ORDER — ROSUVASTATIN CALCIUM 10 MG/1
10 TABLET, COATED ORAL NIGHTLY
Qty: 90 TABLET | Refills: 0 | Status: SHIPPED | OUTPATIENT
Start: 2023-12-19

## 2023-12-19 RX ORDER — ALBUTEROL SULFATE 90 UG/1
AEROSOL, METERED RESPIRATORY (INHALATION)
Qty: 18 G | Refills: 1 | Status: SHIPPED | OUTPATIENT
Start: 2023-12-19

## 2024-01-08 DIAGNOSIS — E03.9 ACQUIRED HYPOTHYROIDISM: ICD-10-CM

## 2024-01-08 RX ORDER — LEVOTHYROXINE SODIUM 0.1 MG/1
100 TABLET ORAL
Qty: 90 TABLET | Refills: 0 | OUTPATIENT
Start: 2024-01-08

## 2024-01-08 NOTE — TELEPHONE ENCOUNTER
Refill Failed Protocol:     Pt requesting refill of   Requested Prescriptions     Pending Prescriptions Disp Refills    LEVOTHYROXINE 100 MCG Oral Tab [Pharmacy Med Name: Levothyroxine Sodium 100 Mcg Tab Lupi] 90 tablet 0     Sig: TAKE ONE TABLET BY MOUTH EVERY MORNING BEFORE BREAKFAST       Last Time Medication was Filled:  12/16/2022    Last Office Visit with Provider: 12/19/2023    Unable to approve refill,   Thyroid Supplements Protocol [id number]/2024 01:30 AM   Protocol Details TSH test in past 12 months    TSH value between 0.350 and 5.500 IU/ml    Appointment in past 12 or next 3 months     No future appointments.

## 2024-01-09 RX ORDER — LEVOTHYROXINE SODIUM 0.1 MG/1
100 TABLET ORAL
Qty: 90 TABLET | Refills: 3 | OUTPATIENT
Start: 2024-01-09

## 2024-01-13 LAB
ABSOLUTE BASOPHILS: 104 CELLS/UL (ref 0–200)
ABSOLUTE EOSINOPHILS: 200 CELLS/UL (ref 15–500)
ABSOLUTE LYMPHOCYTES: 1904 CELLS/UL (ref 850–3900)
ABSOLUTE MONOCYTES: 414 CELLS/UL (ref 200–950)
ABSOLUTE NEUTROPHILS: 4278 CELLS/UL (ref 1500–7800)
ALBUMIN/GLOBULIN RATIO: 1.9 (CALC) (ref 1–2.5)
ALBUMIN: 5 G/DL (ref 3.6–5.1)
ALKALINE PHOSPHATASE: 51 U/L (ref 37–153)
ALT: 21 U/L (ref 6–29)
AST: 21 U/L (ref 10–35)
BASOPHILS: 1.5 %
BILIRUBIN, TOTAL: 0.5 MG/DL (ref 0.2–1.2)
BUN: 8 MG/DL (ref 7–25)
CALCIUM: 10.2 MG/DL (ref 8.6–10.4)
CARBON DIOXIDE: 29 MMOL/L (ref 20–32)
CHLORIDE: 102 MMOL/L (ref 98–110)
CHOL/HDLC RATIO: 2 (CALC)
CHOLESTEROL, TOTAL: 179 MG/DL
CREATININE: 0.78 MG/DL (ref 0.5–1.03)
EGFR: 87 ML/MIN/1.73M2
EOSINOPHILS: 2.9 %
GLOBULIN: 2.6 G/DL (CALC) (ref 1.9–3.7)
GLUCOSE: 96 MG/DL (ref 65–99)
HDL CHOLESTEROL: 88 MG/DL
HEMATOCRIT: 42.4 % (ref 35–45)
HEMOGLOBIN: 13.9 G/DL (ref 11.7–15.5)
LDL-CHOLESTEROL: 77 MG/DL (CALC)
LYMPHOCYTES: 27.6 %
MCH: 28.7 PG (ref 27–33)
MCHC: 32.8 G/DL (ref 32–36)
MCV: 87.6 FL (ref 80–100)
MONOCYTES: 6 %
MPV: 10.9 FL (ref 7.5–12.5)
NEUTROPHILS: 62 %
NON-HDL CHOLESTEROL: 91 MG/DL (CALC)
PLATELET COUNT: 300 THOUSAND/UL (ref 140–400)
POTASSIUM: 4.2 MMOL/L (ref 3.5–5.3)
PROTEIN, TOTAL: 7.6 G/DL (ref 6.1–8.1)
RDW: 12.9 % (ref 11–15)
RED BLOOD CELL COUNT: 4.84 MILLION/UL (ref 3.8–5.1)
SODIUM: 141 MMOL/L (ref 135–146)
TRIGLYCERIDES: 61 MG/DL
TSH W/REFLEX TO FT4: 0.68 MIU/L (ref 0.4–4.5)
WHITE BLOOD CELL COUNT: 6.9 THOUSAND/UL (ref 3.8–10.8)

## 2024-01-15 ENCOUNTER — PATIENT MESSAGE (OUTPATIENT)
Dept: FAMILY MEDICINE CLINIC | Facility: CLINIC | Age: 59
End: 2024-01-15

## 2024-01-15 DIAGNOSIS — E03.9 ACQUIRED HYPOTHYROIDISM: ICD-10-CM

## 2024-01-15 RX ORDER — LEVOTHYROXINE SODIUM 0.1 MG/1
100 TABLET ORAL
Qty: 90 TABLET | Refills: 3 | Status: SHIPPED | OUTPATIENT
Start: 2024-01-15

## 2024-01-15 NOTE — TELEPHONE ENCOUNTER
From: Mine Silva  To: Miriam Green  Sent: 1/15/2024 10:35 AM CST  Subject: Thyroid medication    Good morning! I’m out of thyroid medication and wondering if you can call in a prescription to [facility]. Additionally, I’m wondering if I could try an increased doseage based on being on the very low end of normal per my test results?    Hope you are staying warm!    Fondly,    Mine Silva  [phone]

## 2024-01-19 DIAGNOSIS — E78.00 PURE HYPERCHOLESTEROLEMIA: ICD-10-CM

## 2024-01-19 RX ORDER — ROSUVASTATIN CALCIUM 10 MG/1
10 TABLET, COATED ORAL NIGHTLY
Qty: 90 TABLET | Refills: 3 | Status: SHIPPED | OUTPATIENT
Start: 2024-01-19

## 2024-05-01 DIAGNOSIS — J45.30 MILD PERSISTENT ASTHMA WITHOUT COMPLICATION (HCC): ICD-10-CM

## 2024-05-02 ENCOUNTER — OFFICE VISIT (OUTPATIENT)
Dept: FAMILY MEDICINE CLINIC | Facility: CLINIC | Age: 59
End: 2024-05-02
Payer: COMMERCIAL

## 2024-05-02 ENCOUNTER — NURSE TRIAGE (OUTPATIENT)
Dept: FAMILY MEDICINE CLINIC | Facility: CLINIC | Age: 59
End: 2024-05-02

## 2024-05-02 VITALS
BODY MASS INDEX: 26.58 KG/M2 | OXYGEN SATURATION: 97 % | HEIGHT: 63 IN | DIASTOLIC BLOOD PRESSURE: 79 MMHG | SYSTOLIC BLOOD PRESSURE: 132 MMHG | WEIGHT: 150 LBS | HEART RATE: 84 BPM | TEMPERATURE: 99 F | RESPIRATION RATE: 20 BRPM

## 2024-05-02 DIAGNOSIS — R68.89 FLU-LIKE SYMPTOMS: Primary | ICD-10-CM

## 2024-05-02 DIAGNOSIS — R06.02 SHORTNESS OF BREATH: ICD-10-CM

## 2024-05-02 PROCEDURE — 87637 SARSCOV2&INF A&B&RSV AMP PRB: CPT | Performed by: NURSE PRACTITIONER

## 2024-05-02 NOTE — TELEPHONE ENCOUNTER
Refill Failed Protocol:     Pt requesting refill of   Requested Prescriptions     Pending Prescriptions Disp Refills    ALBUTEROL 108 (90 Base) MCG/ACT Inhalation Aero Soln [Pharmacy Med Name: Albuterol Sulfate Hfa 108 Mcg/Act Aer Lupi] 8.5 g 0     Sig: INHALE 2 PUFFS BY MOUTH EVERY 4 HOURS AS NEEDED FOR SHORTNESS OF BREATH OR WHEEZING       Last Time Medication was Filled:  12/19/2023    Last Office Visit with Provider: 12/19/2023    Unable to approve refill,     Asthma & COPD Medication Protocol [id number]/01/2024 05:51 PM   Protocol Details Asthma Action Score greater than or equal to 20    Appointment in past 6 or next 3 months    AAP/ACT given in last 12 months         Mild persistent asthma without complication  - albuterol 108 (90 Base) MCG/ACT Inhalation Aero Soln; INHALE 2 PUFFS BY MOUTH EVERY 4 HOURS AS NEEDED FOR SHORTNESS OF BREATH OR WHEEZING  Dispense: 18 g; Refill: 1  - Budesonide-Formoterol Fumarate 160-4.5 MCG/ACT Inhalation Aerosol; Inhale 2 puffs into the lungs 2 (two) times daily. Rinse after use  Dispense: 30.6 g; Refill: 3  ACT 19 due to recent illness but that is resolved  AAP given and reviewed  Lungs clear normal pulse ox  Continue Symbicort and albuterol as needed    No future appointments.   Return in about 1 year (around 12/19/2024) for annual physical, fasting labs AM, sooner if needed

## 2024-05-02 NOTE — PROGRESS NOTES
Subjective:   Patient ID: Mine Silav is a 59 year old female.    Patient is a 59 year old female who presents today with complaints of headaches, fevers (TMAX 100F), body aches, chills, cough, chest congestion, runny nose, nasal congestion, shortness of breath and wheezing x last night. Vomited x 1 this morning, thinks due to cough. Patient has a history of asthma and this feels like her typical shortness of breath/wheezing. However, has been using her inhalers without any relief of the shortness of breath. Feels the wheezing is cleared when she coughs. Denies sore throat, ear pain, nausea, diarrhea, abdominal pain, leg swelling or chest pain. Just returned home from a work trip, traveled via air plane. The coworker she traveled with called her this morning with the same symptoms. Home COVID test this morning was negative. Attempted treatment prior to arrival = Mucinex, Tylenol and both Albuterol and Symbicort inhalers.        History/Other:   Review of Systems   Constitutional:  Positive for chills and fever.   HENT:  Positive for congestion and rhinorrhea. Negative for ear pain and sore throat.    Respiratory:  Positive for cough, shortness of breath and wheezing.    Cardiovascular:  Negative for chest pain and leg swelling.   Gastrointestinal:  Positive for vomiting. Negative for abdominal pain, diarrhea and nausea.   Musculoskeletal:  Positive for myalgias.     Current Outpatient Medications   Medication Sig Dispense Refill    rosuvastatin 10 MG Oral Tab Take 1 tablet (10 mg total) by mouth nightly. 90 tablet 3    levothyroxine 100 MCG Oral Tab Take 1 tablet (100 mcg total) by mouth before breakfast. 90 tablet 3    albuterol 108 (90 Base) MCG/ACT Inhalation Aero Soln INHALE 2 PUFFS BY MOUTH EVERY 4 HOURS AS NEEDED FOR SHORTNESS OF BREATH OR WHEEZING 18 g 1    Budesonide-Formoterol Fumarate 160-4.5 MCG/ACT Inhalation Aerosol Inhale 2 puffs into the lungs 2 (two) times daily. Rinse after use 30.6 g 3     melatonin 1 MG Oral Tab Take 3 tablets (3 mg total) by mouth nightly.       Allergies:  Allergies   Allergen Reactions    Clindamycin RASH and ITCHING    Dander      HORSE    Dust      /79   Pulse 84   Temp 99.1 °F (37.3 °C)   Resp 20   Ht 5' 3\" (1.6 m)   Wt 150 lb (68 kg)   SpO2 97%   BMI 26.57 kg/m²       Objective:   Physical Exam  Vitals reviewed.   Constitutional:       General: She is awake. She is not in acute distress.     Appearance: Normal appearance. She is well-developed and well-groomed. She is not ill-appearing, toxic-appearing or diaphoretic.   HENT:      Head: Normocephalic and atraumatic.      Right Ear: Tympanic membrane, ear canal and external ear normal.      Left Ear: Tympanic membrane, ear canal and external ear normal.      Nose: Nose normal.      Mouth/Throat:      Lips: Pink.      Mouth: Mucous membranes are moist. No oral lesions.      Pharynx: Oropharynx is clear. Uvula midline.   Cardiovascular:      Rate and Rhythm: Normal rate and regular rhythm.      Heart sounds: Normal heart sounds.   Pulmonary:      Effort: Pulmonary effort is normal. No respiratory distress.      Breath sounds: Normal breath sounds and air entry. No decreased breath sounds, wheezing, rhonchi or rales.   Lymphadenopathy:      Cervical: No cervical adenopathy.   Skin:     General: Skin is warm and dry.   Neurological:      Mental Status: She is alert and oriented to person, place, and time.   Psychiatric:         Behavior: Behavior is cooperative.         Assessment & Plan:   1. Flu-like symptoms    2. Shortness of breath        Orders Placed This Encounter   Procedures    SARS-CoV-2/Flu A and B/RSV by PCR ([facility])       Meds This Visit:  Requested Prescriptions      No prescriptions requested or ordered in this encounter     Discussed with patient  that due to HPI, duration of symptoms and clinical exam findings, I am recommending transfer to a higher level of care.  Due to the limited diagnostic  capabilities of this location, I am unable to fully evaluate her shortness of breath that is unrelieved with her typical inhalers for asthma.  Patient will go to [facility]. Offered EMS transport. She notes she will have her  drive her.  She is comfortable with self transport.  She vu and is comfortable with this plan.

## 2024-05-02 NOTE — TELEPHONE ENCOUNTER
Reason for Disposition   Patient wants to be seen    Answer Assessment - Initial Assessment Questions  1. TEMPERATURE: \"What is the most recent temperature?\"  \"How was it measured?\"       100   2. ONSET: \"When did the fever start?\"       Last night  3. CHILLS: \"Do you have chills?\" If yes: \"How bad are they?\"  (e.g., none, mild, moderate, severe)    - NONE: no chills    - MILD: feeling cold    - MODERATE: feeling very cold, some shivering (feels better under a thick blanket)    - SEVERE: feeling extremely cold with shaking chills (general body shaking, rigors; even under a thick blanket)       mild  4. OTHER SYMPTOMS: \"Do you have any other symptoms besides the fever?\"  (e.g., abdomen pain, cough, diarrhea, earache, headache, sore throat, urination pain)      Headache, cough,   5. CAUSE: If there are no symptoms, ask: \"What do you think is causing the fever?\"       Caught something  6. CONTACTS: \"Does anyone else in the family have an infection?\"      no  7. TREATMENT: \"What have you done so far to treat this fever?\" (e.g., medications)      Alive, started Musicnex last night   8. IMMUNOCOMPROMISE: \"Do you have of the following: diabetes, HIV positive, splenectomy, cancer chemotherapy, chronic steroid treatment, transplant patient, etc.\"      No  9. PREGNANCY: \"Is there any chance you are pregnant?\" \"When was your last menstrual period?\"      No  10. TRAVEL: \"Have you traveled out of the country in the last month?\" (e.g., travel history, exposures)        No    Protocols used: Fever-A-[place]    Pt called, states she woke up very sick. Reports congestion, cough, fever, headache, and possible asthma exacerbation.     Pt reports coworker is also sick, but no one else is at her home.     Pt reports she started taking Musinex last night and took Advil this morning for fever.     Pt reports hx of asthma, states she hears some wheezing when she breaths.     Based on s/s and no appt with PCP, recommended pt go to [facility] or [facility] to  get evaluated. Pt v/u.

## 2024-05-03 ENCOUNTER — TELEPHONE (OUTPATIENT)
Dept: FAMILY MEDICINE CLINIC | Facility: CLINIC | Age: 59
End: 2024-05-03

## 2024-05-03 DIAGNOSIS — R68.89 FLU-LIKE SYMPTOMS: Primary | ICD-10-CM

## 2024-05-03 DIAGNOSIS — J10.1 INFLUENZA A: ICD-10-CM

## 2024-05-03 LAB
FLUAV + FLUBV RNA SPEC NAA+PROBE: DETECTED
FLUAV + FLUBV RNA SPEC NAA+PROBE: NOT DETECTED
RSV RNA SPEC NAA+PROBE: NOT DETECTED
SARS-COV-2 RNA RESP QL NAA+PROBE: NOT DETECTED

## 2024-05-03 RX ORDER — OSELTAMIVIR PHOSPHATE 75 MG/1
75 CAPSULE ORAL 2 TIMES DAILY
Qty: 10 CAPSULE | Refills: 0 | Status: SHIPPED | OUTPATIENT
Start: 2024-05-03

## 2024-05-03 NOTE — TELEPHONE ENCOUNTER
Pt called, states she went to [facility] and tested positive for influenza a. Was not prescribed anything at the time of her appt. Wants to know if PCP can prescribe medication.     Pt states she should still be within the window for Tamiflu.     Routed to provider for review and advice.

## 2024-05-03 NOTE — TELEPHONE ENCOUNTER
Normally require appt  Sent in Rx tamiflu  If not better in 5 days, needs recheck, high risk of pneumonia  Please inform

## 2024-05-04 RX ORDER — ALBUTEROL SULFATE 90 UG/1
AEROSOL, METERED RESPIRATORY (INHALATION)
Qty: 8.5 G | Refills: 0 | Status: SHIPPED | OUTPATIENT
Start: 2024-05-04

## 2024-05-06 NOTE — TELEPHONE ENCOUNTER
Outreach call to patient to confirm she is odoing better and no further needs form PCP. No answer left VM to return call if any concerns, questions, or need for follow up appointment. Provided office number.

## 2024-05-06 NOTE — TELEPHONE ENCOUNTER
LMOM to return call to the office. Provided pt office phone [phone] along with office hours.

## 2024-12-22 DIAGNOSIS — E03.9 ACQUIRED HYPOTHYROIDISM: ICD-10-CM

## 2024-12-23 RX ORDER — LEVOTHYROXINE SODIUM 100 UG/1
100 TABLET ORAL
Qty: 90 TABLET | Refills: 0 | OUTPATIENT
Start: 2024-12-23

## 2024-12-23 NOTE — TELEPHONE ENCOUNTER
Refill(s) Requested:   Requested Prescriptions     Pending Prescriptions Disp Refills    LEVOTHYROXINE 100 MCG Oral Tab [Pharmacy Med Name: Levothyroxine Sodium 100 Mcg Tab Lupi] 90 tablet 0     Sig: Take 1 tablet by mouth before breakfast.       Medication Last Prescribed:      Last Rx Date Dose Quantity Refills   1/15/2024 100 MCG 90 3 refills    Has dose or medication been changed    since last prescription? *Review notes* [ ] Yes  [ ] No     Last office visit: Visit date not found (in-office)  Visit date not found (virtual visit)     Relevant details from LOV note:   Acquired hypothyroidism  - TSH W Reflex To Free T4 [E]; Future  Euthymic  Continue levothyroxine     Relevant lab results:   T4, FREE (ng/dL)   Date Value   12/30/2022 1.4     TSH (mIU/L)   Date Value   12/30/2022 0.95     TSH W/REFLEX TO FT4 (mIU/L)   Date Value   01/12/2024 0.68        Patient was asked to follow-up in: 1 year    Appointment due: December 2024    Future Appointments: Visit date not found     Action taken:  [x] Patient is overdue for follow up appointment  [ ] Due for appointment- no future appointment scheduled

## 2024-12-26 DIAGNOSIS — J45.30 MILD PERSISTENT ASTHMA WITHOUT COMPLICATION (HCC): ICD-10-CM

## 2024-12-26 DIAGNOSIS — E03.9 ACQUIRED HYPOTHYROIDISM: ICD-10-CM

## 2024-12-27 ENCOUNTER — PATIENT MESSAGE (OUTPATIENT)
Dept: FAMILY MEDICINE CLINIC | Facility: CLINIC | Age: 59
End: 2024-12-27

## 2024-12-27 DIAGNOSIS — J45.30 MILD PERSISTENT ASTHMA WITHOUT COMPLICATION (HCC): ICD-10-CM

## 2024-12-27 RX ORDER — LEVOTHYROXINE SODIUM 100 UG/1
100 TABLET ORAL
Qty: 30 TABLET | Refills: 0 | OUTPATIENT
Start: 2024-12-27

## 2024-12-27 RX ORDER — ALBUTEROL SULFATE 90 UG/1
INHALANT RESPIRATORY (INHALATION)
Qty: 8.5 G | Refills: 0 | OUTPATIENT
Start: 2024-12-27

## 2024-12-27 NOTE — TELEPHONE ENCOUNTER
Refill(s) Requested:   Requested Prescriptions     Pending Prescriptions Disp Refills    levothyroxine 100 MCG Oral Tab 30 tablet 0     Sig: Take 1 tablet (100 mcg total) by mouth before breakfast.     Refused Prescriptions Disp Refills    albuterol 108 (90 Base) MCG/ACT Inhalation Aero Soln 8.5 g 0     Sig: INHALE 2 PUFFS BY MOUTH EVERY 4 HOURS AS NEEDED FOR SHORTNESS OF BREATH OR WHEEZING       Medication Last Prescribed:      Last Rx Date Dose Quantity Refills   Albuterol 5/04/24    Levothyroxine 1/15/2024 108 mcg/act       1/15/2024 8.5 g      90  0      3    Has dose or medication been changed    since last prescription? *Review notes* [ ] Yes  [ x] No     Last office visit: Visit date not found (in-office)  Visit date not found (virtual visit)     Relevant details from LOV note:    Acquired hypothyroidism  - TSH W Reflex To Free T4 [E]; Future  Euthymic  Continue levothyroxine     Relevant lab results:   T4, FREE (ng/dL)   Date Value   12/30/2022 1.4     TSH (mIU/L)   Date Value   12/30/2022 0.95     TSH W/REFLEX TO FT4 (mIU/L)   Date Value   01/12/2024 0.68        Patient was asked to follow-up in: 1 year    Appointment due: December 2024    Future Appointments: 1/3/2025 Miriam Green DO     Action taken:  [x] Patient is overdue for follow up appointment. Message was sent to patient to call back office and schedule appointment.

## 2024-12-30 RX ORDER — ALBUTEROL SULFATE 90 UG/1
INHALANT RESPIRATORY (INHALATION)
Qty: 1 EACH | Refills: 0 | Status: SHIPPED | OUTPATIENT
Start: 2024-12-30

## 2024-12-30 RX ORDER — LEVOTHYROXINE SODIUM 100 UG/1
100 TABLET ORAL
Qty: 90 TABLET | Refills: 0 | Status: SHIPPED | OUTPATIENT
Start: 2024-12-30

## 2024-12-30 NOTE — TELEPHONE ENCOUNTER
Next OV with Dr Green on 01/03/25  Levothyroxine 100 mcg   TSH with Reflex to Free T4 normal on 01/12/24  Approve/ deny?

## 2025-01-03 ENCOUNTER — OFFICE VISIT (OUTPATIENT)
Dept: FAMILY MEDICINE CLINIC | Facility: CLINIC | Age: 60
End: 2025-01-03
Payer: COMMERCIAL

## 2025-01-03 VITALS
HEIGHT: 63 IN | DIASTOLIC BLOOD PRESSURE: 80 MMHG | BODY MASS INDEX: 28.24 KG/M2 | RESPIRATION RATE: 16 BRPM | WEIGHT: 159.38 LBS | OXYGEN SATURATION: 97 % | SYSTOLIC BLOOD PRESSURE: 138 MMHG | HEART RATE: 97 BPM | TEMPERATURE: 97 F

## 2025-01-03 DIAGNOSIS — Z12.31 ENCOUNTER FOR SCREENING MAMMOGRAM FOR MALIGNANT NEOPLASM OF BREAST: ICD-10-CM

## 2025-01-03 DIAGNOSIS — Z13.29 SCREENING FOR ENDOCRINE, NUTRITIONAL, METABOLIC AND IMMUNITY DISORDER: ICD-10-CM

## 2025-01-03 DIAGNOSIS — E78.00 PURE HYPERCHOLESTEROLEMIA: ICD-10-CM

## 2025-01-03 DIAGNOSIS — E06.3 HYPOTHYROIDISM DUE TO HASHIMOTO THYROIDITIS: ICD-10-CM

## 2025-01-03 DIAGNOSIS — K21.9 GASTROESOPHAGEAL REFLUX DISEASE, UNSPECIFIED WHETHER ESOPHAGITIS PRESENT: ICD-10-CM

## 2025-01-03 DIAGNOSIS — C44.319 BASAL CELL CARCINOMA (BCC) OF SKIN OF OTHER PART OF FACE: ICD-10-CM

## 2025-01-03 DIAGNOSIS — J02.9 SORE THROAT: ICD-10-CM

## 2025-01-03 DIAGNOSIS — R93.1 ABNORMAL CT SCAN OF HEART: ICD-10-CM

## 2025-01-03 DIAGNOSIS — J06.9 VIRAL URI WITH COUGH: ICD-10-CM

## 2025-01-03 DIAGNOSIS — Z82.62 FAMILY HISTORY OF OSTEOPOROSIS IN MOTHER: ICD-10-CM

## 2025-01-03 DIAGNOSIS — Z13.228 SCREENING FOR ENDOCRINE, NUTRITIONAL, METABOLIC AND IMMUNITY DISORDER: ICD-10-CM

## 2025-01-03 DIAGNOSIS — Z13.820 SCREENING FOR OSTEOPOROSIS: ICD-10-CM

## 2025-01-03 DIAGNOSIS — Z13.0 SCREENING FOR ENDOCRINE, NUTRITIONAL, METABOLIC AND IMMUNITY DISORDER: ICD-10-CM

## 2025-01-03 DIAGNOSIS — Z13.21 SCREENING FOR ENDOCRINE, NUTRITIONAL, METABOLIC AND IMMUNITY DISORDER: ICD-10-CM

## 2025-01-03 DIAGNOSIS — Z80.0 FAMILY HISTORY OF COLON CANCER: ICD-10-CM

## 2025-01-03 DIAGNOSIS — Z23 NEED FOR VACCINATION: ICD-10-CM

## 2025-01-03 DIAGNOSIS — J45.30 MILD PERSISTENT ASTHMA WITHOUT COMPLICATION (HCC): ICD-10-CM

## 2025-01-03 DIAGNOSIS — Z80.0 FAMILY HISTORY OF ESOPHAGEAL CANCER: ICD-10-CM

## 2025-01-03 DIAGNOSIS — Z00.00 ANNUAL PHYSICAL EXAM: Primary | ICD-10-CM

## 2025-01-03 LAB
ALBUMIN SERPL-MCNC: 4.5 G/DL (ref 3.2–4.8)
ALBUMIN/GLOB SERPL: 1.4 {RATIO} (ref 1–2)
ALP LIVER SERPL-CCNC: 54 U/L
ALT SERPL-CCNC: 22 U/L
ANION GAP SERPL CALC-SCNC: 8 MMOL/L (ref 0–18)
AST SERPL-CCNC: 23 U/L (ref ?–34)
BASOPHILS # BLD AUTO: 0.1 X10(3) UL (ref 0–0.2)
BASOPHILS NFR BLD AUTO: 1.6 %
BILIRUB SERPL-MCNC: 0.4 MG/DL (ref 0.2–1.1)
BUN BLD-MCNC: 9 MG/DL (ref 9–23)
CALCIUM BLD-MCNC: 10 MG/DL (ref 8.7–10.4)
CHLORIDE SERPL-SCNC: 103 MMOL/L (ref 98–112)
CHOLEST SERPL-MCNC: 169 MG/DL (ref ?–200)
CO2 SERPL-SCNC: 28 MMOL/L (ref 21–32)
CONTROL LINE PRESENT WITH A CLEAR BACKGROUND (YES/NO): YES YES/NO
COVID19 BINAX NOW ANTIGEN: NOT DETECTED
CREAT BLD-MCNC: 0.71 MG/DL
EGFRCR SERPLBLD CKD-EPI 2021: 97 ML/MIN/1.73M2 (ref 60–?)
EOSINOPHIL # BLD AUTO: 0.37 X10(3) UL (ref 0–0.7)
EOSINOPHIL NFR BLD AUTO: 5.9 %
ERYTHROCYTE [DISTWIDTH] IN BLOOD BY AUTOMATED COUNT: 13.2 %
FASTING PATIENT LIPID ANSWER: NO
FASTING STATUS PATIENT QL REPORTED: NO
GLOBULIN PLAS-MCNC: 3.2 G/DL (ref 2–3.5)
GLUCOSE BLD-MCNC: 92 MG/DL (ref 70–99)
HCT VFR BLD AUTO: 42.7 %
HDLC SERPL-MCNC: 74 MG/DL (ref 40–59)
HGB BLD-MCNC: 13.9 G/DL
IMM GRANULOCYTES # BLD AUTO: 0.05 X10(3) UL (ref 0–1)
IMM GRANULOCYTES NFR BLD: 0.8 %
KIT LOT #: NORMAL NUMERIC
LDLC SERPL CALC-MCNC: 81 MG/DL (ref ?–100)
LYMPHOCYTES # BLD AUTO: 2.61 X10(3) UL (ref 1–4)
LYMPHOCYTES NFR BLD AUTO: 42 %
MCH RBC QN AUTO: 28.3 PG (ref 26–34)
MCHC RBC AUTO-ENTMCNC: 32.6 G/DL (ref 31–37)
MCV RBC AUTO: 86.8 FL
MONOCYTES # BLD AUTO: 0.54 X10(3) UL (ref 0.1–1)
MONOCYTES NFR BLD AUTO: 8.7 %
NEUTROPHILS # BLD AUTO: 2.55 X10 (3) UL (ref 1.5–7.7)
NEUTROPHILS # BLD AUTO: 2.55 X10(3) UL (ref 1.5–7.7)
NEUTROPHILS NFR BLD AUTO: 41 %
NONHDLC SERPL-MCNC: 95 MG/DL (ref ?–130)
OPERATOR ID: NORMAL
OSMOLALITY SERPL CALC.SUM OF ELEC: 286 MOSM/KG (ref 275–295)
PLATELET # BLD AUTO: 221 10(3)UL (ref 150–450)
POTASSIUM SERPL-SCNC: 4 MMOL/L (ref 3.5–5.1)
PROT SERPL-MCNC: 7.7 G/DL (ref 5.7–8.2)
RBC # BLD AUTO: 4.92 X10(6)UL
SODIUM SERPL-SCNC: 139 MMOL/L (ref 136–145)
STREP GRP A CUL-SCR: NEGATIVE
T4 FREE SERPL-MCNC: 1.5 NG/DL (ref 0.8–1.7)
TRIGL SERPL-MCNC: 75 MG/DL (ref 30–149)
TSI SER-ACNC: 0.75 UIU/ML (ref 0.55–4.78)
VLDLC SERPL CALC-MCNC: 12 MG/DL (ref 0–30)
WBC # BLD AUTO: 6.2 X10(3) UL (ref 4–11)

## 2025-01-03 PROCEDURE — 84439 ASSAY OF FREE THYROXINE: CPT | Performed by: FAMILY MEDICINE

## 2025-01-03 PROCEDURE — 80061 LIPID PANEL: CPT | Performed by: FAMILY MEDICINE

## 2025-01-03 PROCEDURE — 80050 GENERAL HEALTH PANEL: CPT | Performed by: FAMILY MEDICINE

## 2025-01-03 RX ORDER — ALBUTEROL SULFATE 90 UG/1
INHALANT RESPIRATORY (INHALATION)
Qty: 1 EACH | Refills: 1 | Status: SHIPPED | OUTPATIENT
Start: 2025-01-03

## 2025-01-03 RX ORDER — ROSUVASTATIN CALCIUM 10 MG/1
10 TABLET, COATED ORAL NIGHTLY
Qty: 90 TABLET | Refills: 3 | Status: SHIPPED | OUTPATIENT
Start: 2025-01-03

## 2025-01-03 RX ORDER — LEVOTHYROXINE SODIUM 100 UG/1
100 TABLET ORAL
Qty: 90 TABLET | Refills: 3 | Status: SHIPPED | OUTPATIENT
Start: 2025-01-03

## 2025-01-03 RX ORDER — BUDESONIDE AND FORMOTEROL FUMARATE DIHYDRATE 160; 4.5 UG/1; UG/1
2 AEROSOL RESPIRATORY (INHALATION) 2 TIMES DAILY
Qty: 30.6 G | Refills: 3 | Status: SHIPPED | OUTPATIENT
Start: 2025-01-03

## 2025-01-03 NOTE — PROGRESS NOTES
REASON FOR VISIT:    Mine Silva is a 59 year old female who presents for an Annual Health Assessment.    C/o sore thr\oat and cough x 3-4 days.  Denies headache, dizziness, SAUCEDO, sob, chest pain, body aches, wheezing. Mild chest congestion.  Has not taken a home COVID-19 test.    HL- Hx of chest pain. Neg troponin- evaluated by [facility] cardiologist Dr Brant Juárez and had CT heart [place] [address] and had coronary angiogram negative for severe disease-no blockages.  Had mild stenosis. Started on Rosuvastatin 10mg. Denies side effects with medication.    Has history of basal cell carcinoma.  Has annual skin exam.    Asthma-using Symbicort as recommended.  Feels well controlled taking medications as prescribed no nighttime symptoms.  Had upper respiratory illness a few weeks ago used albuterol weekly but that has resolved.  Needs Flu shot is up-to-date, due for Pneumovax.  No hospitalizations or ER visits.  Triggers are allergy and/viral . Currently denies coughing, chest tightness, shortness of breath or wheezing.  Denies any nighttime symptoms.  Needs new COVID-19 booster and flu shot.   PCV 20.    Pt had a history of hypothyroidism and here to recheck. Has been tolerating the medication well.  Denies missing doses.    Last TSH   TSH (mIU/L)   Date Value   2022 0.95   01/10/2022 1.78   10/31/2020 2.06   was year ago, needs repeat. Denies any shakiness, palpitations, weight changes, fatigue.      Denies complaints   monogamous-no condom use   vaginal deliveries  Last Pap: 2021 normal   2016- WNL-repeat 5 years within normal limits negative HPV high risk  Menopausal at 47 - denies vaginal bleeding  Multivitamin with vitamin D  calcium intake-  Last mammogram 2023 WNL- Birads 1, 2011-Birads2 Leticia avila  Colonoscopy: scheduled- 3/24/2023 recal 3 years.  History of 2016 noted diverticulosis-tubular adenoma rectum -GI Dr. Sofia-  regular exercise 3 times a week for 60  minutes- with   Diet:  fruits and vegetables- 3x  and lean meat, limits processed  Regular dental visits -brushes teeth regularly  Annual eye exam- Yes   cigs:never  Etoh 2-4:  Drinks weekly-social  FamHx:reviewed-brother esophageal cancer, other brother MI age 47, father  COPD had a history of esophageal strictures, mother with hypertension    Family History   Problem Relation Age of Onset    Gastro-Intestinal Disorder Father         esophageal strictures    Other (Other) Father         COPD    Hypertension Mother     Other (MI) Brother 47    Asthma Brother         Esophageal cancer    Cancer Brother 44        esophageal cancer     Wt Readings from Last 6 Encounters:   25 159 lb 6.4 oz (72.3 kg)   24 150 lb (68 kg)   23 153 lb 12.8 oz (69.8 kg)   22 144 lb (65.3 kg)   22 149 lb 9.6 oz (67.9 kg)   21 143 lb 6.4 oz (65 kg)       Patient Active Problem List   Diagnosis    GERD (gastroesophageal reflux disease)    Family history of esophageal cancer    Family history of colon cancer    Hypothyroidism    Chronic superficial gastritis without bleeding    Mild persistent asthma without complication (HCC)    History of breast abscess    Basal cell carcinoma (BCC) of skin of face    Environmental and seasonal allergies    H/O breast surgery    Hashimoto's thyroiditis    BMI 28.0-28.9,adult    Skin lesion of face    History of basal cell carcinoma (BCC) excision    History of adenomatous polyp of colon    Pure hypercholesterolemia    Abnormal CT scan of heart     Current Outpatient Medications   Medication Sig Dispense Refill    albuterol 108 (90 Base) MCG/ACT Inhalation Aero Soln INHALE 2 PUFFS BY MOUTH EVERY 4 HOURS AS NEEDED FOR SHORTNESS OF BREATH OR WHEEZING 1 each 0    rosuvastatin 10 MG Oral Tab Take 1 tablet (10 mg total) by mouth nightly. 90 tablet 3    levothyroxine 100 MCG Oral Tab Take 1 tablet (100 mcg total) by mouth before breakfast. 90 tablet 3     Budesonide-Formoterol Fumarate 160-4.5 MCG/ACT Inhalation Aerosol Inhale 2 puffs into the lungs 2 (two) times daily. Rinse after use 30.6 g 3     Wt Readings from Last 6 Encounters:   01/03/25 159 lb 6.4 oz (72.3 kg)   05/02/24 150 lb (68 kg)   12/19/23 153 lb 12.8 oz (69.8 kg)   12/16/22 144 lb (65.3 kg)   11/01/22 149 lb 9.6 oz (67.9 kg)   12/14/21 143 lb 6.4 oz (65 kg)     Body mass index is 28.24 kg/m².    No results found for: \"GLUCOSE\"  Lab Results   Component Value Date    CHOLEST 179 01/12/2024    CHOLEST 233 (H) 12/30/2022    CHOLEST 216 (H) 01/10/2022     Lab Results   Component Value Date    HDL 88 01/12/2024    HDL 83 12/30/2022    HDL 83 01/10/2022     No results found for: \"TRIGLY\"  Lab Results   Component Value Date    LDL 77 01/12/2024     (H) 12/30/2022     (H) 01/10/2022     Lab Results   Component Value Date    AST 21 01/12/2024    AST 17 12/30/2022    AST 19 01/10/2022     Lab Results   Component Value Date    ALT 21 01/12/2024    ALT 16 12/30/2022    ALT 19 01/10/2022     Lab Results   Component Value Date    TSH 0.95 12/30/2022    TSH 1.78 01/10/2022    TSH 2.06 10/31/2020     Lab Results   Component Value Date    BUN 8 01/12/2024    BUN 11 12/30/2022    BUN 14 01/10/2022    CREATSERUM 0.78 01/12/2024    CREATSERUM 0.64 12/30/2022    CREATSERUM 0.78 01/10/2022       General Health     How would you describe your current health state?: Good    Type of Diet: Balanced    How do you maintain positive mental well-being?: Visiting Family;Visiting Friends;Games;Puzzles;Social Interaction    How would you describe your daily physical activity?: Light    If you are a male age 45-79 or a female age 55-79, do you take aspirin?: No    Have you had any immunizations at another office such as Influenza, Hepatitis B, Tetanus, or Pneumococcal?: No    At any time do you feel concerned for the safety/well-being of yourself and/or your children, in your home or elsewhere?: No     CAGE:     Cut: Have  you ever felt you should Cut down on your drinking?: No    Annoyed: Have people Annoyed you by criticizing your drinking?: No    Guilty: Have you ever felt bad or Guilty about your drinking?: No    Eye Opener: Have you ever had a drink first thing in the morning to steady your nerves or to get rid of a hangover (Eye opener)?: No    Scoring  Total Score: 0     Depression Screening (PHQ-2/PHQ-9): Over the LAST 2 WEEKS   Little interest or pleasure in doing things (over the last two weeks)?: Not at all    Feeling down, depressed, or hopeless (over the last two weeks)?: Not at all    PHQ-2 SCORE: 0        PREVENTATIVE SERVICES  INDICATIONS AND SCHEDULE Recommendation Internal Lab or Procedure External Lab or Procedure   Breast Cancer Screening   Every 2 yrs age 50-74 Health Maintenance   Topic Date Due    Mammogram  02/20/2024       Pap Every 3 yrs age 21-65 or Pap and HPV every 5 yrs age 30-65 Health Maintenance   Topic Date Due    Pap Smear  12/14/2026       Chlamydia Screening Screen Annually age<25, if sex active/on OCPs; >24 high risk No results found for: \"CHLAMYDIA\"    Colonoscopy Screen Every 10 years Health Maintenance   Topic Date Due    Colorectal Cancer Screening  03/24/2026       Flex Sigmoidoscopy Screen  Every 5 years No results found for this or any previous visit.    Fecal Occult Blood  Annually Occult Blood, Stool (no units)   Date Value   06/21/2016 NEGATIVE       Obesity Screening Screen all adults annually Body mass index is 28.24 kg/m².      Preventive Services for Which Recommendations Vary with Risk Recommendation Internal Lab or Procedure External Lab or Procedure   Cholesterol Screening Recommended screening varies with age, risk and gender LDL-CHOLESTEROL (mg/dL (calc))   Date Value   01/12/2024 77       Diabetes Screening  if history of high blood pressure or other  risk factors No results found for: \"A1C\"  GLUCOSE (mg/dL)   Date Value   01/12/2024 96         Gonorrhea Screening if high risk  No results found for: \"GONOCOCCUS\"    HIV Screening For all adults age 18-65, older adults at increased risk No results found for: \"HIV\"    Syphilis Screening Screen if pregnant or high risk No results found for: \"RPR\"    Hepatitis C Screening Screen those at high risk plus screen one time for adults born 1946-1 965 No results found for: \"HCVAB\"    Tuberculosis Screen if high risk No components found for: \"PPDINDURAT\"      Disease Monitoring:    SPECIFIC DISEASE MONITORING Internal Lab or Procedure External Lab or Procedure   Annual Monitoring of Persistent     Medications (ACE/ARB, digoxin, diuretics)    Potassium  Annually POTASSIUM (mmol/L)   Date Value   01/12/2024 4.2         No data to display                Creatinine  Annually CREATININE (mg/dL)   Date Value   01/12/2024 0.78         No data to display                Digoxin Serum Conc  Annually No results found for: \"DIGOXIN\"      No data to display                Diabetes      HgbA1C  Annually No results found for: \"A1C\"      No data to display                Creat/alb ratio  Annually CREATININE (mg/dL)   Date Value   01/12/2024 0.78        LDL  Annually LDL-CHOLESTEROL (mg/dL (calc))   Date Value   01/12/2024 77         No data to display                 Dilated Eye exam  Annually      No data to display                   No data to display                Asthma  (Annually between Nov. 1 & Dec. 31)    Date of last AAP/ACT and counseling given on importance of controller meds.        10/13/20- 18, increase symbicort 2 puffs bid         ALLERGIES:     Allergies   Allergen Reactions    Clindamycin RASH and ITCHING    Dander      HORSE    Dust        CURRENT MEDICATIONS:   Current Outpatient Medications   Medication Sig Dispense Refill    albuterol 108 (90 Base) MCG/ACT Inhalation Aero Soln INHALE 2 PUFFS BY MOUTH EVERY 4 HOURS AS NEEDED FOR SHORTNESS OF BREATH OR WHEEZING 1 each 0    rosuvastatin 10 MG Oral Tab Take 1 tablet (10 mg total) by mouth nightly.  90 tablet 3    levothyroxine 100 MCG Oral Tab Take 1 tablet (100 mcg total) by mouth before breakfast. 90 tablet 3    Budesonide-Formoterol Fumarate 160-4.5 MCG/ACT Inhalation Aerosol Inhale 2 puffs into the lungs 2 (two) times daily. Rinse after use 30.6 g 3      MEDICAL INFORMATION:   Past Medical History:    Abnormal CT scan of heart    Had negative coronary angiogram at [facility].  Started on rosuvastatin 10 mg by Dr. Brant Juárez.     CT Angiogram Coronary               Anatomical Region    Laterality    Modality      Chest    --    Computed Tomography      Vascular    --    Ultrasound     Impression     :  1. Non-obstructive coronary artery disease.  2. Normal flow by CT FFR.  3. Non-cardiac findings a    Allergic rhinitis    Asthma (HCC)    Atherosclerosis of coronary artery    Discovered on heart scan    Basal cell carcinoma (BCC) of skin of face    Excised cheek-2018 with revision by plastic surgeon since large area. Moh's procedure  Dr. Velasquez [facility] [id number] [phone]    BMI 28.0-28.9,adult    Breast abscess    Breast mass, right    6 oclock 2-3cm smooth  tender mass- abscess vs cyst vs other    Cancer (HCC)    skin    Chronic superficial gastritis without bleeding    Dysphagia    chronic esophagitis    Environmental and seasonal allergies    Esophageal reflux    Extrinsic asthma, unspecified    Last exacerbation 3 weeks ago - triggered by cold, allergy.    Family history of colon cancer    Maternal aunt      Family history of esophageal cancer    Brother      GERD (gastroesophageal reflux disease)    H/O breast surgery    Hashimoto's thyroiditis    History of adenomatous polyp of colon    History of basal cell carcinoma (BCC) excision    History of breast abscess    left      Hyperlipidemia    Hyperplastic polyp of stomach    Hypothyroid    Hypothyroidism    Mild persistent asthma without complication (HCC)    Pure hypercholesterolemia    Skin lesion of face    Tubular  adenoma of rectum      Past Surgical History:   Procedure Laterality Date    Colonoscopy      Colonoscopy & polypectomy  2016    Dreyer/ prescence    D & c      Egd  2016    Dreyer/prescence    Samantha localization wire 1 site left (cpt=19281) Left 2015    BENIGN          Other surgical history      cosmetic surgery of the face     Reduction left  2007    BREAST LIFT    Reduction right Right 2007    BREAST LIFT      Family History   Problem Relation Age of Onset    Gastro-Intestinal Disorder Father         esophageal strictures    Other (Other) Father         COPD    Hypertension Mother     Other (MI) Brother 47    Asthma Brother         Esophageal cancer    Cancer Brother 44        esophageal cancer      SOCIAL HISTORY:   Social History     Socioeconomic History    Marital status:      Spouse name: Lei    Number of children: 2   Tobacco Use    Smoking status: Never    Smokeless tobacco: Never   Vaping Use    Vaping status: Never Used   Substance and Sexual Activity    Alcohol use: Yes     Alcohol/week: 4.0 standard drinks of alcohol     Types: 4 Glasses of wine per week     Comment: social    Drug use: No    Sexual activity: Yes   Other Topics Concern    Caffeine Concern No    Exercise Yes    Seat Belt Yes    Special Diet No    Stress Concern No    Weight Concern Yes     Occ:   :yes       REVIEW OF SYSTEMS:   GENERAL: feels well otherwise  SKIN: denies any unusual skin lesions  EYES: denies blurred vision or double vision  HEENT: Admits nasal congestion, sore throat and cough, denies sinus pain   LUNGS: denies shortness of breath with exertion  CARDIOVASCULAR: denies chest pain on exertion  GI: denies abdominal pain, denies heartburn  : denies dysuria, vaginal discharge or itching, amenorrhea/menopause-denies vaginal bleeding  MUSCULOSKELETAL: denies back pain  NEURO: denies headaches  PSYCHE: denies depression or anxiety  HEMATOLOGIC: denies hx of anemia  ENDOCRINE: History of  hypothyroidism  ALL/ASTHMA: hx of allergy or asthma    EXAM:   /80 (BP Location: Left arm, Patient Position: Sitting, Cuff Size: adult)   Pulse 97   Temp 97.2 °F (36.2 °C) (Temporal)   Resp 16   Ht 5' 3\" (1.6 m)   Wt 159 lb 6.4 oz (72.3 kg)   SpO2 97%   BMI 28.24 kg/m²    No LMP recorded. (Menstrual status: Menopause).   GENERAL: well developed, well nourished, in no apparent distress  SKIN: no rashes, no suspicious lesions  HEENT: atraumatic, normocephalic, ears and throat are clear wearing mask  EYES:PERRLA, EOMI, normal optic disk, conjunctiva are clear  NECK: supple, no adenopathy, no bruits  CHEST: no chest tenderness  BREAST: no dominant or suspicious mass bilateral, adnexa clear bilateral, no mild breast contour shape and size  LUNGS: clear to auscultation  CARDIO: RRR without murmur  GI: good BS's, no masses, HSM or tenderness  : Deferred-no concerns  RECTAL: \Deferred-no concerns  MUSCULOSKELETAL: back is not tender, FROM of the back  EXTREMITIES: no cyanosis, clubbing or edema  NEURO: Oriented times three, cranial nerves are intact, motor and sensory are grossly intact    ASSESSMENT AND OTHER RELEVANT CHRONIC CONDITIONS:   Mine Silva is a 59 year old female who presents for an Annual Health Assessment.     PLAN SUMMARY:   1.  Annual physical exam  -Encourage Mediterranean diet  -Encouraged exercise 30 minutes to 60 minutes 3-5 times weekly for 150-300mins to prevent obesity and chronic disease and eliminate stress and its effect on the body.  -encouraged not to continue not smoking  -safe sex practices discussed -recommend condom use  -Immunizations-needs COVID-19 booster, up-to-date influenza, RSV today  -thin prep in 3  -5years since HPV negative  - [facility] JAZMYNE 2D+3D SCREENING BILAT (CPT=77067/49162); Future  - CBC With Differential With Platelet; Future  - Comp Metabolic Panel; Future  - Lipid Panel; Future  - TSH and Free T4 [E]; Future  - RSV Vaccine - Arexvy 0.5mL ages 60+ [93278]  -  CBC With Differential With Platelet  - Comp Metabolic Panel  - Lipid Panel  - TSH and Free T4 [E]    2. Hypothyroidism due to Hashimoto thyroiditis  - TSH and Free T4 [E]; Future  - levothyroxine 100 MCG Oral Tab; Take 1 tablet (100 mcg total) by mouth before breakfast.  Dispense: 90 tablet; Refill: 3  - TSH and Free T4 [E]  Controlled  Euthymic  Continue levothyroxine    3. Pure hypercholesterolemia  4. Abnormal CT scan of heart  - Comp Metabolic Panel; Future  - Lipid Panel; Future  - rosuvastatin 10 MG Oral Tab; Take 1 tablet (10 mg total) by mouth nightly.  Dispense: 90 tablet; Refill: 3  - Comp Metabolic Panel  - Lipid Panel  Controlled  Mild atherosclerosis on angiogram  The patient's ASCVD 10 year risk score is 2.7.  Continue rosuvastatin  Encourage Mediterranean diet, weight loss and regular exercise 150-300 minutes weekly  Repeat labs today and screen annually    5. Mild persistent asthma without complication (HCC)  - albuterol 108 (90 Base) MCG/ACT Inhalation Aero Soln; INHALE 2 PUFFS BY MOUTH EVERY 4 HOURS AS NEEDED FOR SHORTNESS OF BREATH OR WHEEZING  Dispense: 1 each; Refill: 1  - Budesonide-Formoterol Fumarate 160-4.5 MCG/ACT Inhalation Aerosol; Inhale 2 puffs into the lungs 2 (two) times daily. Rinse after use  Dispense: 30.6 g; Refill: 3  ACT 22  AAP given and reviewed  Controlled  Continue albuterol as needed  Vaccinations up-to-date and updating RSV vaccine today  Recheck annually sooner if needed/asthma flare    6. Gastroesophageal reflux disease, unspecified whether esophagitis present  7. Family history of esophageal cancer  Not on any medication  Controlled with diet  Avoid triggers  Denies epigastric pain, dysphagia, melena, unexplained weight loss    8. Basal cell carcinoma (BCC) of skin of other part of face  Continue annual skin exam and monitoring with dermatologist    9. Family history of colon cancer  Up-to-date on colonoscopy-next due 3/24/2026-every 3 years    10. Screening for  osteoporosis  11. Family history of osteoporosis in mother  - XR DEXA BONE DENSITOMETRY (CPT=77080); Future    12. BMI 28.0-28.9,adult  States gained weight over the holidays we will work on weight loss    13. Viral URI with cough  14. Sore throat  - Strep A Assay W/Optic-negative  - COVID19 BinaxNOW Antigen-negative  Afebrile, lungs clear, normal pulse ox, benign exam  Supportive care discussed  Increase fluids and rest  May use Mucinex DM 12-hour extended release as needed  If cough becomes bothersome at night will consider giving Tessalon Perles for bedtime  If symptoms not resolved within 1 to 2 weeks or if worsening or develops fever or breathing issues or other symptoms and needs immediate evaluation    15. Need for vaccination  - RSV Vaccine - Arexvy 0.5mL ages 60+ [33194]    16. Screening for endocrine, nutritional, metabolic and immunity disorder  - CBC With Differential With Platelet; Future  - Comp Metabolic Panel; Future  - TSH and Free T4 [E]; Future  - CBC With Differential With Platelet  - Comp Metabolic Panel  - TSH and Free T4 [E]    17. Encounter for screening mammogram for malignant neoplasm of breast  - [facility] JAZMYNE 2D+3D SCREENING BILAT (CPT=77067/70248); Future    The patient indicates understanding of these issues and agrees to the plan.  Return in about 1 year (around 1/3/2026) for annual physical, medication monitoring, sooner if needed., discuss labs.    Diet counseling perfomed  Exercise counseling perfomed  STI Prevention counseling perfomed  Endometrial cancer awareness counseling performed    SUGGESTED VACCINATIONS - Influenza, Pneumococcal, Zoster, Tetanus     Immunization History   Administered Date(s) Administered    18-49 FLUBLOK Influenza Vaccine, (81606) [facility] 10/07/2024    Covid-19 Vaccine Moderna 100 mcg/0.5 ml 01/21/2021, 02/18/2021, 12/28/2021    Covid-19 Vaccine Moderna Bivalent 50mcg/0.5mL 12+ years 10/28/2022    FLU VAC QIV SPLIT 3 YRS AND OLDER (14800) 11/08/2016     FLULAVAL 6 months & older 0.5 ml Prefilled syringe (13195) 09/26/2017, 10/11/2018, 10/13/2020    FLUZONE 6 months and older PFS 0.5 ml (73552) 09/26/2017, 10/11/2018, 11/07/2019    Flublok Quad Influenza Vaccine (67879) 10/27/2021, 10/02/2022, 10/14/2023    Fluvirin, 3 Years & >, Im 11/04/2015    Influenza 12/08/2003, 10/15/2014    Pneumococcal Conjugate PCV20 12/16/2022    Pneumovax 23 01/29/2019    Positive Measles Titer 06/21/2016    Positive Mumps Titer 06/21/2016    Positive Rubella Titer 06/21/2016    TDAP 06/21/2016    Tb Intradermal Test 05/08/2017, 05/15/2017    Varicella Deferred (Had Chicken Pox) 02/17/1969    Zoster Vaccine Recombinant Adjuvanted (Shingrix) 03/11/2022, 06/24/2022       Influenza Annually   Pneumococcal if high risk   Td/Tdap once then every 10 years   HPV Females 11-26: 3 doses   Zoster (Shingles) 60 and older: one dose   Varicella 2 doses if not immune   MMR 1-2 doses if born after 1957 and not immune

## 2025-01-20 ENCOUNTER — HOSPITAL ENCOUNTER (OUTPATIENT)
Dept: BONE DENSITY | Age: 60
Discharge: HOME | End: 2025-01-20
Attending: FAMILY MEDICINE
Payer: COMMERCIAL

## 2025-01-20 DIAGNOSIS — Z82.62 FAMILY HISTORY OF OSTEOPOROSIS IN MOTHER: ICD-10-CM

## 2025-01-20 DIAGNOSIS — Z13.820 SCREENING FOR OSTEOPOROSIS: ICD-10-CM

## 2025-01-20 PROCEDURE — 77080 DXA BONE DENSITY AXIAL: CPT | Performed by: FAMILY MEDICINE

## 2025-01-22 DIAGNOSIS — M81.0 OSTEOPOROSIS OF LUMBAR SPINE: Primary | ICD-10-CM

## 2025-01-22 DIAGNOSIS — Z82.62 FAMILY HISTORY OF OSTEOPOROSIS IN MOTHER: ICD-10-CM

## 2025-02-05 ENCOUNTER — OFFICE VISIT (OUTPATIENT)
Facility: CLINIC | Age: 60
End: 2025-02-05
Payer: COMMERCIAL

## 2025-02-05 ENCOUNTER — TELEPHONE (OUTPATIENT)
Facility: CLINIC | Age: 60
End: 2025-02-05

## 2025-02-05 VITALS
OXYGEN SATURATION: 98 % | HEIGHT: 63 IN | BODY MASS INDEX: 28 KG/M2 | WEIGHT: 158 LBS | HEART RATE: 71 BPM | SYSTOLIC BLOOD PRESSURE: 108 MMHG | DIASTOLIC BLOOD PRESSURE: 76 MMHG

## 2025-02-05 DIAGNOSIS — M81.0 OSTEOPOROSIS OF LUMBAR SPINE: ICD-10-CM

## 2025-02-05 DIAGNOSIS — M81.6 LOCALIZED OSTEOPOROSIS WITHOUT CURRENT PATHOLOGICAL FRACTURE: Primary | ICD-10-CM

## 2025-02-05 DIAGNOSIS — Z82.62 FAMILY HISTORY OF OSTEOPOROSIS IN MOTHER: ICD-10-CM

## 2025-02-05 PROCEDURE — 3074F SYST BP LT 130 MM HG: CPT | Performed by: STUDENT IN AN ORGANIZED HEALTH CARE EDUCATION/TRAINING PROGRAM

## 2025-02-05 PROCEDURE — 3078F DIAST BP <80 MM HG: CPT | Performed by: STUDENT IN AN ORGANIZED HEALTH CARE EDUCATION/TRAINING PROGRAM

## 2025-02-05 PROCEDURE — 3008F BODY MASS INDEX DOCD: CPT | Performed by: STUDENT IN AN ORGANIZED HEALTH CARE EDUCATION/TRAINING PROGRAM

## 2025-02-05 PROCEDURE — 99205 OFFICE O/P NEW HI 60 MIN: CPT | Performed by: STUDENT IN AN ORGANIZED HEALTH CARE EDUCATION/TRAINING PROGRAM

## 2025-02-05 NOTE — TELEPHONE ENCOUNTER
Message received from Dr. Roy to begin Prolia work up.    Patient needs labs drawn- orders already entered.    Patient was given dental clearance letter.    Submitted patient via [employer] portal, will await benefits review.

## 2025-02-05 NOTE — PATIENT INSTRUCTIONS
Summary of our visit:    Please get X rays - you may call the scheduling number to schedule your X rays   Please get labs at 8 am, fasting  Send us a My chart message about how much of calcium you are taking  Lifestyle changes to improve bone health  Resistance, balance, and weight-bearing  exercises. Choose safe movements that don’t increase the risk of falling  Eat a balanced diet that include: calcium rich foods, dairy products fortified with vitamin D, and fish if possible. Get enough calcium and vitamin D, either through diet or supplements (at   least 1,000-1,200 mg of calcium; 400-800 IU of vitamin D daily under age 50 or at least 800-1,000 IU after age 50  Some resources:  NUTRITION: https://[email or web address]  GENERAL INFO: [email or web address]  Please have your dentist fax the clearance letter to us      General follow up information:  Please let us know if you require any refills at least 1 week prior to your medication running out. If you do run out of medication, please call our office ASAP to request refills (do not wait until your follow up).  Please call us if you experience any problems with insurance coverage of medication, lab work, or imaging.   Lab results and imaging will typically be reviewed at follow up appointments, or within 3-5 business days of ALL results being in if you do not have an appointment scheduled in the near future. Our office will contact you for any abnormal results requiring more urgent follow up or action.   The on-call pager is for urgent matters only. If you are a type 1 diabetic and run out of insulin after business hours 8AM-4PM, you may call the on-call pager for a refill to a 24 hour pharmacy. If you have adrenal insufficiency and run out of steroids, you may call the on-call pager for a refill to a 24 hour pharmacy. All other refill requests should be requested during business  hours.    Return Visit   [x] Dr. Roy in 6 months   [x] Fasting/8AM labs  [x] Central scheduling # for ultrasound/nuclear med/CT/MRI/DXA/IR  [x] Dental clearance form          OSTEOPOROSIS:  Osteoporosis, which means porous bones, is a progressive condition in which bones become structurally weak and are more likely to fracture or break. Even with a healthy lifestyle, you may need additional therapy to protect against bone loss and fractures. Your doctor may need to prescribe medications such as:     Bisphosphonates (alendronate - fosamax, risedronate, ibandronate, zoledronic acid - reclast)   Raloxifene   Teriparatide   Abaloparatide   Romosozumab   Estrogen (when also prescribed for the relief of menopausal symptoms)   Calcitonin   These treatment options are effective but may have side effects. Talk with your doctor to determine whether you need treatment and which option is best for you. The [place] Food and Drug Administration (FDA) has approved several medications for preventing and treating osteoporosis.       Bisphosphonates are the kind of medicine used most often to treat osteoporosis. They do this by:     Preventing bones from getting weaker by slowing the natural breakdown of bone.  Lowering the risk of spine fractures and most lower the risk of hip fracture and other kinds of fractures as well.  Preventing and treating postmenopausal osteoporosis by slowing bone loss while increasing bone density mass.      Several types of bisphosphonates are available, in pill or liquid form. Some are given intravenously (IV), which means the medicine is injected into a vein at the doctor’s office or a hospital. Bisphosphonate medications include:    Alendronate (Fosamax®). Tablet available in daily and weekly forms   Risedronate (Actonel, Atelvia®). Tablet available in daily, weekly, and monthly forms   Ibandronate (Boniva®). Available in monthly tablet or as an injection once every three months   Zoledronic Acid  (Reclast®). An intravenous infusion given once a year for treatment, or every two years for prevention   The bisphosphonates alendronate, risedronate, and zoledronic acid have also been approved for the treatment of steroid-induced osteoporosis in men and women who need long-term use of medications to treat inflammatory conditions (which can contribute to osteoporosis).     Side effects of bisphosphonates are uncommon, but may include abdominal, bone, or muscle pain. These medications may also cause nausea or heartburn. Tablet forms may cause irritation of the esophagus.     Experts say that the benefits of taking bisphosphonates greatly outweigh the risks for most people with osteoporosis. Overall, when you take this type of medicine, your chance of preventing fractures is high, and the risk of serious problems is low.      However, side effects of bisphosphonates can include:     Nausea, heartburn, swallowing problems, or irritation of the esophagus (the tube that carries food and liquid from your mouth to your stomach)   Pain in the muscles, joints, bones, or stomach   Some people have reported serious side effects, but studies have shown that these are very rare. Bisphosphonates has been linked to osteonecrosis (degeneration) of the jaw bone, particular after high-dose, long-term therapy, as might be given during cancer treatment. The risk of osteonecrosis of the jaw is greatest after dental operations. There is also a concern that long-term treatment may increase the risk of so-called atypical femoral fractures--fractures through the shaft of the thigh bone with little or no trauma.     Bisphosphonates are not recommended for premenopausal women who may become pregnant or for people with severely impaired kidney function.     If you take a bisphosphonate and you’re having side effects, tell your doctor. Your doctor might give you a different kind of medicine to overcome the side effects. For example, taking  medicine through an IV instead of swallowing a pill can overcome heartburn.     Prolia:  -Injection every 6 months, given in the endocrinology office  -Particularly powerful at building bone density at the spine  -No technical limit on use, though we still prefer not to administer this open-endedly  -No effect on kidney, can be used even in dialysis patients  -Very important that patients stay strict on 6 months injection schedule. If missing scheduled injections by a long time, the drug can cause loss of bone as it wears off which can lead to \"rebound vertebral fractures\". Whenever it is time to stop the drug, we overlap it with Reclast or Fosamax to allow the patient to maintain the bone density built by Prolia, while allowing Prolia to safely wear off.   -Common side effects: muscle soreness at injection site, body ache, increased risk of common infections such as cold and UTI    Reclast:  -IV infusion (approximately 30 minutes) once a year, given in the infusion center at [facility]  -10 year lifetime maximum use (does not need to be 10 years consecutively)  -Gets metabolized through the kidneys, so recommended that patients hydrate well before, during, and after the infusion to help the drug flush through the system  -Can be stopped and started any time, does not need to overlap with anything and no concerns if it is given late  -Common side effects: muscle ache, runny nose    Both drugs: preferably administered for 3-5 years followed by a \"drug holiday\" (intentional time off all medication so the skeleton doesn't get too stiff)    Both drugs: EXTREMELY RARE side effects. 1) Atypical femur fracture - cracks in the femur which can occur with prolonged use when bones become \"too stiff\". This is avoided by pursuing drug holidays and respecting maximum use limits as above. 2) Osteonecrosis of the jaw - failure to heal after major dental surgery such as implants, extractions, etc. Though this is highly rare, we prefer  that any known dental work be performed prior to starting medications for this reason. We require approval from your dentist before proceeding with either drug.

## 2025-02-05 NOTE — PROGRESS NOTES
ENDOCRINOLOGY, DIABETES & METABOLISM CONSULT NOTE   Date: 02/05/25  Name: Mine Silva  Referring Physician: Miriam Green    Subjective:    HISTORY OF PRESENT ILLNESS:   Mine Silva is a 59 year old female seen in consultation for her bone health evaluation.  Recently found out that her mother has osteoporosis and suffered a compression fracture.   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  Suggested patient get evaluated.  Recently had a DEXA scan and here to discuss results consistent with osteoporosis      PMH pertinent for  Basal cell carcinoma  Hypothyroidism    OSTEOPOROSIS RISK FACTORS ASSESSMENT  Postmenopausal Yes, menopause was at [age]   Maternal hx of osteoporosis or hx of parental hip fx:  Yes, dxed at age 85 . Has a compression fracture  at 85. Now on Infusion (Reclaast)   Recent Fracture: No   Previous fracture after the age of 50:  No   Hx of kidney stones: No   Frequent falls: No   Poor vision: No   Decrease in height: Yes, an ich    New or Worsening Back Pain: Yes, lower back pain since a year, gradually aches and pain, no acuity. Getting worse    History of Vit D insufficiency:   Current Vitamin D supplementation: Yes, 2000 IU/   Poor intake of calcium:  Daily calcium intake: Yes, unsure of dose ([employer]). Not a milk person, has a lot of greek yoghurt daily for breakfast, cottage chese   Caffeine intake: Yes, 2 cups   Smoking: No   Alcohol intake >3/d:  No   Hx of thyroid disease: Yes, On Lt4 since age 12   Hx of calcium problems or hyperparathyroidism: No   Previous treatment for osteoporosis: No   Malabsorption, chronic diarrhea, celiac sprue: No   History of RA: No   History of disordered eating: No     Intake of medications that cause bone loss:  Long term steroid use: No  Aromatase inhibitor: No  Seizure medications: No  GnRH agonist: No  PPI: Yes, 2 years on it, but off it since 2023 atleast due to dietary modifications  Lithium: No  SSRI:  No  Actos/Avandia: No    Treatment Contraindications:  Dysphagia: no. EGD with gastritis in 2016.  Pain on swallowing: no. Patient noted to have brother with esophageal cancer.  Father with history of esophageal strictures.  Plans for dental procedures: no  History of radiation: no  History of bone tumors/bone cancer: no  History of Paget disease of the bone: no  Personal history of malignancy: History of basal cell carcinoma.  On annual skin examination.  Kidney function:   Lab Results   Component Value Date    ALKPHO 54 01/03/2025    EGFRCR 97 01/03/2025     Alk phos: 54  Atrial fibrillation/CAD: Evaluated by [facility] cardiologist Dr. Brant Juárez.  Had CT heart calcium score of 335, coronary angiogram negative for severe disease, no blockages.  Mild stenosis.  On statin 10 mg.  Has a brother with MI at age 47.  Hyperparathyroidism: no    Activities of daily living:  Exercise: Regular exercise 3 times a week for 60 minutes with . Strength training.  Very active.  Work: has a stand up desk at office        Allergies, PMH, SocHx and FHx reviewed and updated as appropriate in Epic on    levothyroxine 100 MCG Oral Tab Take 1 tablet (100 mcg total) by mouth before breakfast. 90 tablet 3    rosuvastatin 10 MG Oral Tab Take 1 tablet (10 mg total) by mouth nightly. 90 tablet 3    albuterol 108 (90 Base) MCG/ACT Inhalation Aero Soln INHALE 2 PUFFS BY MOUTH EVERY 4 HOURS AS NEEDED FOR SHORTNESS OF BREATH OR WHEEZING 1 each 1    Budesonide-Formoterol Fumarate 160-4.5 MCG/ACT Inhalation Aerosol Inhale 2 puffs into the lungs 2 (two) times daily. Rinse after use 30.6 g 3     Allergies[1]  Current Outpatient Medications   Medication Sig Dispense Refill    levothyroxine 100 MCG Oral Tab Take 1 tablet (100 mcg total) by mouth before breakfast. 90 tablet 3    rosuvastatin 10 MG Oral Tab Take 1 tablet (10 mg total) by mouth nightly. 90 tablet 3    albuterol 108 (90 Base) MCG/ACT Inhalation Aero Soln INHALE 2 PUFFS BY  MOUTH EVERY 4 HOURS AS NEEDED FOR SHORTNESS OF BREATH OR WHEEZING 1 each 1    Budesonide-Formoterol Fumarate 160-4.5 MCG/ACT Inhalation Aerosol Inhale 2 puffs into the lungs 2 (two) times daily. Rinse after use 30.6 g 3     Past Medical History:    Abnormal CT scan of heart    Had negative coronary angiogram at [facility] Everywhere.  Started on rosuvastatin 10 mg by Dr. Brant Juárez.     CT Angiogram Coronary               Anatomical Region    Laterality    Modality      Chest    --    Computed Tomography      Vascular    --    Ultrasound     Impression     :  1. Non-obstructive coronary artery disease.  2. Normal flow by CT FFR.  3. Non-cardiac findings a    Allergic rhinitis    Asthma (HCC)    Atherosclerosis of coronary artery    Discovered on heart scan    Basal cell carcinoma (BCC) of skin of face    Excised cheek-2018 with revision by plastic surgeon since large area. Moh's procedure  Dr. Velasquez [id number]    BMI 28.0-28.9,adult    Breast abscess    Breast mass, right    6 oclock 2-3cm smooth  tender mass- abscess vs cyst vs other    Cancer (HCC)    skin    Chronic superficial gastritis without bleeding    Dysphagia    chronic esophagitis    Environmental and seasonal allergies    Esophageal reflux    Extrinsic asthma, unspecified    Last exacerbation 3 weeks ago - triggered by cold, allergy.    Family history of colon cancer    Maternal aunt      Family history of esophageal cancer    Brother      GERD (gastroesophageal reflux disease)    H/O breast surgery    Hashimoto's thyroiditis    History of adenomatous polyp of colon    History of basal cell carcinoma (BCC) excision    History of breast abscess    left      Hyperlipidemia    Hyperplastic polyp of stomach    Hypothyroid    Hypothyroidism    Mild persistent asthma without complication (HCC)    Pure hypercholesterolemia    Skin lesion of face    Tubular adenoma of rectum     Past Surgical History:   Procedure  Laterality Date    Colonoscopy      Colonoscopy & polypectomy  2016    Dreyer/ prescence    D & c      Egd  2016    Dreyer/prescence    Samantha localization wire 1 site left (cpt=19281) Left 2015    BENIGN          Other surgical history      cosmetic surgery of the face     Reduction left  2007    BREAST LIFT    Reduction right Right 2007    BREAST LIFT     Social History     Socioeconomic History    Marital status:      Spouse name: Lei    Number of children: 2   Tobacco Use    Smoking status: Never     Passive exposure: Never    Smokeless tobacco: Never   Vaping Use    Vaping status: Never Used   Substance and Sexual Activity    Alcohol use: Yes     Alcohol/week: 4.0 standard drinks of alcohol     Types: 4 Glasses of wine per week     Comment: social    Drug use: No    Sexual activity: Yes   Other Topics Concern    Caffeine Concern No    Exercise Yes    Seat Belt Yes    Special Diet No    Stress Concern No    Weight Concern Yes     Family History   Problem Relation Age of Onset    Gastro-Intestinal Disorder Father         esophageal strictures    Other (Other) Father         COPD    Hypertension Mother     Other (MI) Brother 47    Asthma Brother         Esophageal cancer    Cancer Brother 44        esophageal cancer       REVIEW OF SYSTEMS: 10 point ROS completed, refer to HPI for pertinent positives    Objective:   PHYSICAL EXAMINATION:    Vitals:    25 1032   BP: 108/76   Pulse: 71   SpO2: 98%   Weight: 158 lb (71.7 kg)   Height: 5' 3\" (1.6 m)     BMI: Body mass index is 27.99 kg/m².     General Appearance:  Alert, in no acute distress, well developed  Eyes:  normal conjunctivae, sclera  Ears/Nose/Mouth/Throat/Neck:  normal hearing, normal speech and no palpable thyroid nodules  Respiratory:  breathing comfortably on room air, is clear to auscultation bilaterally  Cardiovascular:  regular rate and rhythm, no murmurs, S3 or S4, no peripheral edema  Psychiatric:  Oriented to  person, place and time, appropriate mood & affect  Skin: Normal moisture and skin texture  Neuro: sensory grossly intact, motor grossly intact. normal gait.    Spine: Slight tenderness in the lumbar spinal area as well as paraspinal area    Lab Review       No results found for: \"EAG\", \"A1C\"   Lab Results   Component Value Date    GLU 92 01/03/2025    BUN 9 01/03/2025    BUNCREA SEE NOTE: 01/12/2024    CREATSERUM 0.71 01/03/2025    ANIONGAP 8 01/03/2025    GFR 93 06/21/2016    GFRNAA 84 01/10/2022    GFRAA 98 01/10/2022    CA 10.0 01/03/2025    OSMOCALC 286 01/03/2025    ALKPHO 54 01/03/2025    AST 23 01/03/2025    ALT 22 01/03/2025    BILT 0.4 01/03/2025    TP 7.7 01/03/2025    ALB 4.5 01/03/2025    GLOBULIN 3.2 01/03/2025    AGRATIO 1.9 01/12/2024     01/03/2025    K 4.0 01/03/2025     01/03/2025    CO2 28.0 01/03/2025        Lab Results   Component Value Date    T4F 1.5 01/03/2025    TSH 0.752 01/03/2025    TSHT4 0.68 01/12/2024      Recent Labs     12/30/22  1235 01/12/24  1323 01/03/25  1542   T4F 1.4  --  1.5   TSH 0.95  --  0.752   TSHT4  --  0.68  --       Lab Results   Component Value Date    VITD 32.5 06/21/2016          Radiology Review            XR DEXA BONE DENSITOMETRY (CPT=77080)    Result Date: 1/20/2025  PROCEDURE:  XR DEXA BONE DENSITOMETRY (CPT=77080)  COMPARISON:  None.  INDICATIONS:  Z13.820 Screening for osteoporosis Z82.62 Family history of osteoporosis in mother  PATIENT STATED HISTORY: (As transcribed by Technologist)  Baseline.       LUMBAR SPINE ANALYSIS RESULTS:    Bone mineral density (BMD) (g/cm2):  0.765  Lumbar T-Score:  -2.6    % young normals:  73    % age matched controls:  86    Change from prior spine examination:  n/a           TOTAL HIP ANALYSIS RESULTS:      Bone mineral density (BMD) (g/cm2):  0.790    Total Hip T-Score:  -1.2    % young normals:  84    % age matched controls:  96    Change from prior hip examination:  n/a           FEMORAL NECK ANALYSIS RESULTS:       Bone mineral density (BMD) (g/cm2):  0.703    Femoral neck T-Score:  -1.3    % young normals:  83    % age matched controls:  100    Change from prior hip examination:  n/a      ADDITIONAL FINDINGS:  No significant additional findings.             CONCLUSION:  Bone mineral density values for the lumbar spine are compatible with the diagnosis of osteoporosis by WHO definition (T score less than -2.5).  Bone mineral density of the left total hip and femoral neck are compatible with osteopenia.  If therapy is initiated, a follow-up study in 1 year may aid in evaluation of therapeutic efficacy.  Recommendation:  The Bone Health and Osteoporosis Foundation (BHOF) recommends pharmacological treatment for patients with a FRAX 10-year risk score of 3% or higher for a hip fracture or 20% or higher for a major osteoporotic fracture, to prevent osteoporosis and reduce fracture risk. All treatment decisions require clinical judgment and consideration of individual patient factors, including patient preferences, comorbidities, previous drug use, risk fractures not captured in the FRAX model (e.g. frailty, falls, vitamin D deficiency, increased bone turnover, interval significant decline in bone density) and possible under- or over-estimation of fracture risk by FRAX. Additional information regarding the FRAX model can be found at the BHOF website: bonehealthandosteoporosis.org.  The World Health Organization has defined the following categories based on bone density: Normal bone:  T-score greater than or equal to -1 Osteopenia: T-score  less than -1 and greater  than -2.5 Osteoporosis:  T-score less than or equal -2.5   LOCATION:  [facility]     Dictated by (CST): Brenton Grant MD on 1/20/2025 at 1:39 PM     Finalized by (CST): Brenton Grant MD on 1/20/2025 at 1:40 PM         DEXA Year Location Therapy Lumbar BMD Lumbar T-Score Total Hip BMD Total Hip T-Score Femoral Neck BMD Femoral Neck T-Score Forearm BMD Forearm T-score   2025  Alfa None 0.765 -2.6 0.790 -1.2 0.703 -1.3.                                                                                      FRAX SCORE  [email or web address]                        ASSESSMENT/PLAN:  Mine Silva is a 59 year old female seen in consultation for:    #Osteoporosis at lumbar spine as per most recent T-score on DEXA  Discussed pathophysiology of bone loss and clinical significance of DEXA scans with the patient.  The patient has confirmed osteoporosis with low T-scores in the lumbar spine.  No imaging available to review spine  Explained the patient's risk factors for osteoporosis; postmenopausal, age, , family history of osteoporosis.  The patient is at high risk for future osteoporotic fractures if her osteoporosis remains untreated.  Recommended workup for secondary causes of osteoporosis, including SPEP, PTH, Alk Phos levels, bone turnover markers, and vitamin D levels.  Will also obtain imaging of spine to rule out occult fractures  Discussed the importance of adopting lifestyle measures, such as adequate calcium and vitamin D intake, exercise, smoking cessation, counseling on fall prevention, and avoidance of heavy alcohol use, to reduce bone loss.  Suggested 1200 mg of elemental calcium daily (total diet plus supplement) and 1000 IU of vitamin D daily as general recommendations.  Recommended pharmacologic therapy for postmenopausal women with established osteoporosis or fragility fracture.  Discussed available treatment options for osteoporosis, including bisphosphonates (oral vs. IV) Prolia injections, other anabolics (Forteo, Tymlos), Evenity as well as their indications, risks, and benefits, including black box warnings.  Suggested 2 forms of therapy due to history of esophageal disorders in the family :   IV bisphosphonate therapy   We also discussed the possibility of using denosumab. We reviewed the potential adverse effects, which include hypocalcemia (more likely  to occur with Vitamin D deficiency or renal insufficiency), rare but serious skin infections (cellulitis), and very rarely with osteonecrosis of the jaw (more likely in higher doses used for malignancy), or atypical femoral fractures. I discussed that these risks are likely to be far outweighed by the potential fracture risk reduction.There is a concern for increased incidence of vertebral fracture(s) associated with denosumab if therapy is suddenly stopped and pt does not receive a following therapy afterwards. Therefore, choosing denosumab will be a life-long choice or will need a subsequent therapy at the end of denosumab cycle to protect bone further. This was discussed in detail with pt.    Advised patient to see the dentist regularly, to notify dentist of using bisphosphonate or denosumab therapy, and to avoid non-emergent dental procedures such as implants and extractions. The patient was also advised to notify us if they develop new or unusual persistent thigh or groin pain.   Patient elected for denosumab.  Discussed that all of this would be as per insurance approval.  Recommended DXA every two years for patients starting on therapy, with additional evaluation for contributing factors if a clinically significant BMD decrease or new fracture occurs.    Dental clearance due.  Clearance form given today.    The above plan was discussed in detail with the patient who verbalized understanding and agreement.      A total of 60 minutes was spent today on obtaining history, reviewing outside records, reviewing pertinent labs/imaging, reviewing relevant pathophysiology with patient, evaluating patient, providing multiple treatment options, communicating with patient's other providers as appropriate, and completing documentation and orders.      Margie Roy MD  [facility]  2/5/2025     Note to patient: The 21 Century Cures Act makes medical notes like these available to patients in the interest of  transparency. However, be advised this is a medical document. It is intended as peer to peer communication. It is written in medical language and may contain abbreviations or verbiage that are unfamiliar. It may appear blunt or direct. Medical documents are intended to carry relevant information, facts as evident, and the clinical opinion of the practitioner.      In reviewing this note, please be advised that Dragon Voice Recognition software used to dictate the note may have made errors in recognizing some of the words or phrases.     Addendum 2/6/2025  Patient called and informed supplements that she was taking:  Also, I’m emailing to let you know my calcium supplement is Milton 600 mg with Vitamin D3. I also recently started taking D3 Milton brand 50 mcg (2000 iu).      Margie Roy MD  [facility]  2/6/2025            [1]   Allergies  Allergen Reactions    Clindamycin RASH and ITCHING    Dander      HORSE    Dust

## 2025-02-06 ENCOUNTER — HOSPITAL ENCOUNTER (OUTPATIENT)
Dept: GENERAL RADIOLOGY | Facility: HOSPITAL | Age: 60
Discharge: HOME | End: 2025-02-06
Attending: STUDENT IN AN ORGANIZED HEALTH CARE EDUCATION/TRAINING PROGRAM
Payer: COMMERCIAL

## 2025-02-06 ENCOUNTER — LAB ENCOUNTER (OUTPATIENT)
Dept: LAB | Facility: HOSPITAL | Age: 60
End: 2025-02-06
Attending: STUDENT IN AN ORGANIZED HEALTH CARE EDUCATION/TRAINING PROGRAM
Payer: COMMERCIAL

## 2025-02-06 DIAGNOSIS — M81.6 LOCALIZED OSTEOPOROSIS WITHOUT CURRENT PATHOLOGICAL FRACTURE: ICD-10-CM

## 2025-02-06 LAB
ALBUMIN SERPL-MCNC: 4.5 G/DL (ref 3.2–4.8)
ALBUMIN/GLOB SERPL: 1.6 {RATIO} (ref 1–2)
ALP LIVER SERPL-CCNC: 48 U/L
ALT SERPL-CCNC: 19 U/L
ANION GAP SERPL CALC-SCNC: 6 MMOL/L (ref 0–18)
AST SERPL-CCNC: 19 U/L (ref ?–34)
BILIRUB SERPL-MCNC: 0.5 MG/DL (ref 0.2–1.1)
BUN BLD-MCNC: 12 MG/DL (ref 9–23)
CALCIUM BLD-MCNC: 9.4 MG/DL (ref 8.7–10.6)
CHLORIDE SERPL-SCNC: 104 MMOL/L (ref 98–112)
CO2 SERPL-SCNC: 30 MMOL/L (ref 21–32)
CREAT BLD-MCNC: 0.74 MG/DL
EGFRCR SERPLBLD CKD-EPI 2021: 93 ML/MIN/1.73M2 (ref 60–?)
EST. AVERAGE GLUCOSE BLD GHB EST-MCNC: 120 MG/DL (ref 68–126)
GLOBULIN PLAS-MCNC: 2.9 G/DL (ref 2–3.5)
GLUCOSE BLD-MCNC: 83 MG/DL (ref 70–99)
HBA1C MFR BLD: 5.8 % (ref ?–5.7)
IGA SERPL-MCNC: 294.8 MG/DL (ref 70–312)
MAGNESIUM SERPL-MCNC: 2.2 MG/DL (ref 1.6–2.6)
OSMOLALITY SERPL CALC.SUM OF ELEC: 289 MOSM/KG (ref 275–295)
PHOSPHATE SERPL-MCNC: 3.8 MG/DL (ref 2.4–5.1)
POTASSIUM SERPL-SCNC: 3.7 MMOL/L (ref 3.5–5.1)
PROT SERPL-MCNC: 7.4 G/DL (ref 5.7–8.2)
PTH-INTACT SERPL-MCNC: 39.4 PG/ML (ref 18.5–88)
SODIUM SERPL-SCNC: 140 MMOL/L (ref 136–145)
VIT D+METAB SERPL-MCNC: 36.5 NG/ML (ref 30–100)

## 2025-02-06 PROCEDURE — 72072 X-RAY EXAM THORAC SPINE 3VWS: CPT | Performed by: STUDENT IN AN ORGANIZED HEALTH CARE EDUCATION/TRAINING PROGRAM

## 2025-02-06 PROCEDURE — 83970 ASSAY OF PARATHORMONE: CPT

## 2025-02-06 PROCEDURE — 36415 COLL VENOUS BLD VENIPUNCTURE: CPT

## 2025-02-06 PROCEDURE — 82330 ASSAY OF CALCIUM: CPT

## 2025-02-06 PROCEDURE — 86334 IMMUNOFIX E-PHORESIS SERUM: CPT

## 2025-02-06 PROCEDURE — 83036 HEMOGLOBIN GLYCOSYLATED A1C: CPT

## 2025-02-06 PROCEDURE — 86364 TISS TRNSGLTMNASE EA IG CLAS: CPT

## 2025-02-06 PROCEDURE — 84165 PROTEIN E-PHORESIS SERUM: CPT

## 2025-02-06 PROCEDURE — 83521 IG LIGHT CHAINS FREE EACH: CPT

## 2025-02-06 PROCEDURE — 84080 ASSAY ALKALINE PHOSPHATASES: CPT

## 2025-02-06 PROCEDURE — 82784 ASSAY IGA/IGD/IGG/IGM EACH: CPT

## 2025-02-06 PROCEDURE — 82523 COLLAGEN CROSSLINKS: CPT

## 2025-02-06 PROCEDURE — 84100 ASSAY OF PHOSPHORUS: CPT

## 2025-02-06 PROCEDURE — 80053 COMPREHEN METABOLIC PANEL: CPT

## 2025-02-06 PROCEDURE — 72100 X-RAY EXAM L-S SPINE 2/3 VWS: CPT | Performed by: STUDENT IN AN ORGANIZED HEALTH CARE EDUCATION/TRAINING PROGRAM

## 2025-02-06 PROCEDURE — 82306 VITAMIN D 25 HYDROXY: CPT

## 2025-02-06 PROCEDURE — 83735 ASSAY OF MAGNESIUM: CPT

## 2025-02-06 NOTE — TELEPHONE ENCOUNTER
Dental Clearance: cleared    [employer] summary: : For the primary MD Purchase option, Prolia and administration will be covered at 100%. There is also a $7750 out of pocket max ($364.09 met). No deductible or coinsurance applies. We have provided in network benefits only.     Prior authorization: No    RN phoned provider line at [phone] and spoke with Zoraida WATTS    No prior authorization needed.     CPT code:    Dx code: M81.6  Reference number: [id number]    Routed for review of labs.    Then can call patient and go over benefits, schedule per provider timeline.    Documents in RN brown folder.

## 2025-02-07 LAB
LC CALCIUM, IONIZED: 5 MG/DL
TTG IGA SER-ACNC: 0.4 U/ML (ref ?–7)

## 2025-02-07 NOTE — TELEPHONE ENCOUNTER
thanks.  Labs reviewed.  Patient ready for Prolia.  Okay to schedule at her convenience.  Thank you. decreased strength/impaired balance

## 2025-02-10 LAB — C-TELOPEPTIDE: 330 PG/ML

## 2025-02-10 NOTE — TELEPHONE ENCOUNTER
Phoned patient and scheduled RN visit for Prolia injection- 2/17 at 2:15p.    Closing this encounter.

## 2025-02-11 LAB — ALKALINE PHOSPHATASE BONE SPECIFIC: 7.9 UG/L

## 2025-02-13 LAB
ALBUMIN SERPL ELPH-MCNC: 4.35 G/DL (ref 3.75–5.21)
ALBUMIN/GLOB SERPL: 1.65 {RATIO} (ref 1–2)
ALPHA1 GLOB SERPL ELPH-MCNC: 0.27 G/DL (ref 0.19–0.46)
ALPHA2 GLOB SERPL ELPH-MCNC: 0.6 G/DL (ref 0.48–1.05)
B-GLOBULIN SERPL ELPH-MCNC: 0.87 G/DL (ref 0.68–1.23)
GAMMA GLOB SERPL ELPH-MCNC: 0.92 G/DL (ref 0.62–1.7)
KAPPA LC FREE SER-MCNC: 1.13 MG/DL (ref 0.33–1.94)
KAPPA LC FREE/LAMBDA FREE SER NEPH: 1.12 {RATIO} (ref 0.26–1.65)
LAMBDA LC FREE SERPL-MCNC: 1.01 MG/DL (ref 0.57–2.63)
PROT SERPL-MCNC: 7 G/DL (ref 5.7–8.2)

## 2025-02-17 ENCOUNTER — NURSE ONLY (OUTPATIENT)
Facility: CLINIC | Age: 60
End: 2025-02-17
Payer: COMMERCIAL

## 2025-02-17 DIAGNOSIS — M81.6 LOCALIZED OSTEOPOROSIS WITHOUT CURRENT PATHOLOGICAL FRACTURE: Primary | ICD-10-CM

## 2025-02-17 NOTE — PROGRESS NOTES
Patient in office for first time Prolia administration.    Reviewed Prolia administration, possible side effects. Reviewed to call office if any side effects post administration.     Reviewed patient should inform Dentist if any dental work to be done. No dental work currently scheduled.     Administered Prolia 60mg in left upper arm subcutaneously at 1415. Patient tolerated well with no complaints.    Patient stayed in office for 15 minutes post administration. No complaints, no side effects noted.    Patient instructed to have Calcium level checked in 7-10 days- order entered.     Scheduled next follow up visit with Dr. Roy for 8/19/25- for next scheduled Prolia injection. Reminder sent to [place] to begin benefits verification 1 month prior.

## 2025-02-19 DIAGNOSIS — R93.7 ABNORMAL X-RAY OF LUMBAR SPINE: Primary | ICD-10-CM

## 2025-02-23 ENCOUNTER — LAB ENCOUNTER (OUTPATIENT)
Dept: LAB | Facility: HOSPITAL | Age: 60
End: 2025-02-23
Attending: STUDENT IN AN ORGANIZED HEALTH CARE EDUCATION/TRAINING PROGRAM
Payer: COMMERCIAL

## 2025-02-23 DIAGNOSIS — M81.6 LOCALIZED OSTEOPOROSIS OF LEQUESNE: Primary | ICD-10-CM

## 2025-02-23 DIAGNOSIS — M81.6 LOCALIZED OSTEOPOROSIS WITHOUT CURRENT PATHOLOGICAL FRACTURE: ICD-10-CM

## 2025-02-23 LAB — CALCIUM BLD-MCNC: 9 MG/DL (ref 8.7–10.6)

## 2025-02-23 PROCEDURE — 86334 IMMUNOFIX E-PHORESIS SERUM: CPT

## 2025-02-23 PROCEDURE — 83521 IG LIGHT CHAINS FREE EACH: CPT

## 2025-02-23 PROCEDURE — 84165 PROTEIN E-PHORESIS SERUM: CPT

## 2025-02-23 PROCEDURE — 82310 ASSAY OF CALCIUM: CPT

## 2025-02-23 PROCEDURE — 36415 COLL VENOUS BLD VENIPUNCTURE: CPT

## 2025-02-26 DIAGNOSIS — R73.03 PREDIABETES: Primary | ICD-10-CM

## 2025-02-26 LAB
ALBUMIN SERPL ELPH-MCNC: 4.35 G/DL (ref 3.75–5.21)
ALBUMIN/GLOB SERPL: 1.7 {RATIO} (ref 1–2)
ALPHA1 GLOB SERPL ELPH-MCNC: 0.23 G/DL (ref 0.19–0.46)
ALPHA2 GLOB SERPL ELPH-MCNC: 0.57 G/DL (ref 0.48–1.05)
B-GLOBULIN SERPL ELPH-MCNC: 0.89 G/DL (ref 0.68–1.23)
GAMMA GLOB SERPL ELPH-MCNC: 0.86 G/DL (ref 0.62–1.7)
KAPPA LC FREE SER-MCNC: 1.02 MG/DL (ref 0.33–1.94)
KAPPA LC FREE/LAMBDA FREE SER NEPH: 1.28 {RATIO} (ref 0.26–1.65)
LAMBDA LC FREE SERPL-MCNC: 0.8 MG/DL (ref 0.57–2.63)
PROT SERPL-MCNC: 6.9 G/DL (ref 5.7–8.2)

## 2025-03-01 PROBLEM — J06.9 VIRAL URI WITH COUGH: Status: RESOLVED | Noted: 2025-01-03 | Resolved: 2025-03-01

## 2025-04-08 ENCOUNTER — MED REC SCAN ONLY (OUTPATIENT)
Dept: FAMILY MEDICINE CLINIC | Facility: CLINIC | Age: 60
End: 2025-04-08

## 2025-04-17 ENCOUNTER — HOSPITAL ENCOUNTER (OUTPATIENT)
Dept: ULTRASOUND IMAGING | Age: 60
Discharge: HOME | End: 2025-04-17
Attending: FAMILY MEDICINE
Payer: COMMERCIAL

## 2025-04-17 DIAGNOSIS — R93.7 ABNORMAL X-RAY OF LUMBAR SPINE: ICD-10-CM

## 2025-04-17 PROCEDURE — 76830 TRANSVAGINAL US NON-OB: CPT | Performed by: FAMILY MEDICINE

## 2025-04-17 PROCEDURE — 76856 US EXAM PELVIC COMPLETE: CPT | Performed by: FAMILY MEDICINE

## 2025-07-23 ENCOUNTER — TELEPHONE (OUTPATIENT)
Facility: CLINIC | Age: 60
End: 2025-07-23

## 2025-07-28 ENCOUNTER — LAB ENCOUNTER (OUTPATIENT)
Dept: LAB | Age: 60
End: 2025-07-28
Attending: STUDENT IN AN ORGANIZED HEALTH CARE EDUCATION/TRAINING PROGRAM

## 2025-07-28 DIAGNOSIS — M81.6 LOCALIZED OSTEOPOROSIS WITHOUT CURRENT PATHOLOGICAL FRACTURE: ICD-10-CM

## 2025-07-28 DIAGNOSIS — R73.03 PREDIABETES: ICD-10-CM

## 2025-07-28 LAB
ALBUMIN SERPL-MCNC: 4.4 G/DL (ref 3.2–4.8)
ALBUMIN/GLOB SERPL: 1.7 (ref 1–2)
ALP LIVER SERPL-CCNC: 38 U/L (ref 50–130)
ALT SERPL-CCNC: 20 U/L (ref 10–49)
ANION GAP SERPL CALC-SCNC: 7 MMOL/L (ref 0–18)
AST SERPL-CCNC: 23 U/L (ref ?–34)
BILIRUB SERPL-MCNC: 0.5 MG/DL (ref 0.2–1.1)
BUN BLD-MCNC: 7 MG/DL (ref 9–23)
CALCIUM BLD-MCNC: 8.9 MG/DL (ref 8.7–10.6)
CHLORIDE SERPL-SCNC: 105 MMOL/L (ref 98–112)
CO2 SERPL-SCNC: 29 MMOL/L (ref 21–32)
CREAT BLD-MCNC: 0.75 MG/DL (ref 0.55–1.02)
EGFRCR SERPLBLD CKD-EPI 2021: 91 ML/MIN/1.73M2 (ref 60–?)
EST. AVERAGE GLUCOSE BLD GHB EST-MCNC: 117 MG/DL (ref 68–126)
FASTING STATUS PATIENT QL REPORTED: NO
GLOBULIN PLAS-MCNC: 2.6 G/DL (ref 2–3.5)
GLUCOSE BLD-MCNC: 102 MG/DL (ref 70–99)
HBA1C MFR BLD: 5.7 % (ref ?–5.7)
OSMOLALITY SERPL CALC.SUM OF ELEC: 290 MOSM/KG (ref 275–295)
POTASSIUM SERPL-SCNC: 4.1 MMOL/L (ref 3.5–5.1)
PROT SERPL-MCNC: 7 G/DL (ref 5.7–8.2)
SODIUM SERPL-SCNC: 141 MMOL/L (ref 136–145)
VIT D+METAB SERPL-MCNC: 32.2 NG/ML (ref 30–100)

## 2025-07-28 PROCEDURE — 83036 HEMOGLOBIN GLYCOSYLATED A1C: CPT

## 2025-07-28 PROCEDURE — 82306 VITAMIN D 25 HYDROXY: CPT

## 2025-07-28 PROCEDURE — 36415 COLL VENOUS BLD VENIPUNCTURE: CPT

## 2025-07-28 PROCEDURE — 80053 COMPREHEN METABOLIC PANEL: CPT

## 2025-08-19 ENCOUNTER — OFFICE VISIT (OUTPATIENT)
Facility: CLINIC | Age: 60
End: 2025-08-19

## 2025-08-19 VITALS
HEIGHT: 63 IN | RESPIRATION RATE: 16 BRPM | DIASTOLIC BLOOD PRESSURE: 72 MMHG | BODY MASS INDEX: 28.35 KG/M2 | SYSTOLIC BLOOD PRESSURE: 114 MMHG | WEIGHT: 160 LBS | HEART RATE: 68 BPM | OXYGEN SATURATION: 98 %

## 2025-08-19 DIAGNOSIS — Z82.62 FAMILY HISTORY OF OSTEOPOROSIS IN MOTHER: ICD-10-CM

## 2025-08-19 DIAGNOSIS — M81.6 LOCALIZED OSTEOPOROSIS WITHOUT CURRENT PATHOLOGICAL FRACTURE: ICD-10-CM

## 2025-08-19 DIAGNOSIS — M81.0 POST-MENOPAUSAL OSTEOPOROSIS: Primary | ICD-10-CM

## 2025-08-19 DIAGNOSIS — M81.0 OSTEOPOROSIS OF LUMBAR SPINE: ICD-10-CM

## 2025-08-19 PROCEDURE — 96372 THER/PROPH/DIAG INJ SC/IM: CPT | Performed by: STUDENT IN AN ORGANIZED HEALTH CARE EDUCATION/TRAINING PROGRAM

## 2025-08-19 PROCEDURE — 3078F DIAST BP <80 MM HG: CPT | Performed by: STUDENT IN AN ORGANIZED HEALTH CARE EDUCATION/TRAINING PROGRAM

## 2025-08-19 PROCEDURE — 99213 OFFICE O/P EST LOW 20 MIN: CPT | Performed by: STUDENT IN AN ORGANIZED HEALTH CARE EDUCATION/TRAINING PROGRAM

## 2025-08-19 PROCEDURE — 3008F BODY MASS INDEX DOCD: CPT | Performed by: STUDENT IN AN ORGANIZED HEALTH CARE EDUCATION/TRAINING PROGRAM

## 2025-08-19 PROCEDURE — 3074F SYST BP LT 130 MM HG: CPT | Performed by: STUDENT IN AN ORGANIZED HEALTH CARE EDUCATION/TRAINING PROGRAM

## 2025-08-25 ENCOUNTER — RESULTS FOLLOW-UP (OUTPATIENT)
Facility: CLINIC | Age: 60
End: 2025-08-25

## 2025-08-25 ENCOUNTER — LAB ENCOUNTER (OUTPATIENT)
Dept: LAB | Age: 60
End: 2025-08-25
Attending: STUDENT IN AN ORGANIZED HEALTH CARE EDUCATION/TRAINING PROGRAM

## 2025-08-25 DIAGNOSIS — M81.6 LOCALIZED OSTEOPOROSIS WITHOUT CURRENT PATHOLOGICAL FRACTURE: ICD-10-CM

## 2025-08-25 LAB — CALCIUM BLD-MCNC: 9.9 MG/DL (ref 8.7–10.6)

## 2025-08-25 PROCEDURE — 82310 ASSAY OF CALCIUM: CPT | Performed by: STUDENT IN AN ORGANIZED HEALTH CARE EDUCATION/TRAINING PROGRAM

## 2025-08-29 ENCOUNTER — HOSPITAL ENCOUNTER (OUTPATIENT)
Dept: GENERAL RADIOLOGY | Age: 60
Discharge: HOME | End: 2025-08-29
Attending: STUDENT IN AN ORGANIZED HEALTH CARE EDUCATION/TRAINING PROGRAM

## 2025-08-29 ENCOUNTER — TELEPHONE (OUTPATIENT)
Facility: CLINIC | Age: 60
End: 2025-08-29

## 2025-08-29 ENCOUNTER — TELEPHONE (OUTPATIENT)
Dept: FAMILY MEDICINE CLINIC | Facility: CLINIC | Age: 60
End: 2025-08-29

## 2025-08-29 ENCOUNTER — HOSPITAL ENCOUNTER (OUTPATIENT)
Age: 60
Discharge: LEFT BEFORE DISCHARGE | End: 2025-08-29

## 2025-08-29 DIAGNOSIS — M54.50 ACUTE LOW BACK PAIN, UNSPECIFIED BACK PAIN LATERALITY, UNSPECIFIED WHETHER SCIATICA PRESENT: ICD-10-CM

## 2025-08-29 DIAGNOSIS — M54.50 ACUTE LOW BACK PAIN, UNSPECIFIED BACK PAIN LATERALITY, UNSPECIFIED WHETHER SCIATICA PRESENT: Primary | ICD-10-CM

## 2025-08-29 PROCEDURE — 72100 X-RAY EXAM L-S SPINE 2/3 VWS: CPT | Performed by: STUDENT IN AN ORGANIZED HEALTH CARE EDUCATION/TRAINING PROGRAM

## 2025-08-29 PROCEDURE — 72220 X-RAY EXAM SACRUM TAILBONE: CPT | Performed by: STUDENT IN AN ORGANIZED HEALTH CARE EDUCATION/TRAINING PROGRAM

## (undated) NOTE — LETTER
ASTHMA ACTION PLAN for Christianne Alcaraz     : 1965     Date: 2021  Provider:  Elise Fox DO  Phone for doctor or clinic: [address] [facility], [facility] [place]  [phone]    ACT Score: 21      You can u patient or submitted via [address]      Signatures:  Provider  Estefanía Estes DO   Patient Caretaker

## (undated) NOTE — LETTER
10/30/19        Christianne Dumont [address] [facility]      Dear Mak Elam,    [address] [facility] records indicate that you have outstanding lab work and or testing that was ordered for you and has not yet been completed:  Orders Placed This Encounter        Samantha

## (undated) NOTE — LETTER
ASTHMA ACTION PLAN for Neva Park     : 1965     Date: 10/13/2020  Provider:  Celeste Reynoso DO  Phone for doctor or clinic: [facility] [facility], [facility] [place]  [phone]    ACT Score: 18      You can u Signatures:  Provider  Champion Slider, DO   Patient Caretaker

## (undated) NOTE — MR AVS SNAPSHOT
[facility] [place]  [address] [facility] [id number] [phone]               Thank you for choosing us for your health care visit with Nayan Wall PA-C. We are glad to serve you and happy to provide you with this summary of your visit. Inhale 1 puff into the lungs 2 (two) times daily.    Commonly known as:  DULERA           Montelukast Sodium 10 MG Tabs   TAKE ONE TABLET BY MOUTH NIGHTLY   Commonly known as:  SINGULAIR           Omeprazole 40 MG Cpdr   Take 1 capsule (40 mg total) by mout

## (undated) NOTE — LETTER
ASTHMA ACTION PLAN for Kinjal Quinones     : 1965     Date: 2018  Provider:  Crystal Young DO  Phone for doctor or clinic: [facility], [address] [place] [address]  [phone]           You can use the colors Sandra Haji, DO   Patient Caretaker

## (undated) NOTE — Clinical Note
May 17, 2017    [address]      Dear Amy Conde: The following are the results of your recent tests. Please review the list of test results.   Your result is the value on the left; we have also supplied the range of no

## (undated) NOTE — LETTER
ASTHMA ACTION PLAN for Mine Silva     : 1965     Date: 1/3/2025  Provider:  Miriam Green DO  Phone for doctor or clinic: [facility], [facility], [address] [address] [phone]    ACT Score: 22      You can use the colors of a traffic light to help learn about your asthma medicines.      1. Green - Go! % of Personal Best Peak Flow Use controller medicine.   Breathing is good  No cough or wheeze  Can work and play Medicine How much to take When to take it    Budesonide-Formoterol Fumarate 160-4.5 MCG/ACT Inhalation Aerosol ig - Route: Inhale 2 puffs into the lungs 2 (two) times daily. Rinse after use - Inhalation       2. Yellow - Caution. 50-79% Personal Best Peak  Flow.  Use reliever medicine to keep an asthma attack from getting bad.   Cough  Wheezing  Tight Chest  Wake up at night Medicine How much to take When to take it    Albuterol inhaler - Take 2 puffs into the lungs every 4 hours as needed.       Additional instructions If your symptoms do not improve or require albuterol inhaler use every 4 to 6 hours, please contact our office at [phone] and ask to speak with the nurse.        3. Red - Stop! Danger!  <50% Personal Best Peak  Flow. Take these medications until  Get help from a doctor   Medicine not helping  Breathing is hard and fast  Nose opens wide  Can't walk  Ribs show  Can't talk well Medicine How much to take When to take it    Albuterol Inhaler - Take 2 puffs every 10 minutes. If no relief after 2 treatments or symptoms are worse, please go to nearest ER or call 911 immediately.       Additional Instructions If your symptoms do not improve and you cannot contact your doctor, [facility] or call 911 immediately!     [x] Asthma Action Plan reviewed with patient (and caregiver if necessary) and a copy of the plan was given to the patient/caregiver.   [] Asthma Action Plan reviewed with patient (and caregiver if necessary)  on the phone and mailed copy to patient or submitted via [employer].     Signatures:  Provider  iMriam Green DO   Patient Caretaker

## (undated) NOTE — LETTER
January 20, 2019        [address]       Dear Lavon Jonas: We have attempted to contact you by phone with no success.  In an effort to provide quality patient care we are reaching out to you to contact the office at your e

## (undated) NOTE — MR AVS SNAPSHOT
[facility] [place]  [address] [facility] [address]  [phone]               Thank you for choosing us for your health care visit with RUBIA Ibrahim. We are glad to serve you and happy to provide you with this summary of your visit.   Ple view more details from this visit by going to [email or web address]. If you've recently had a stay at the Hospital you can access your discharge instructions in [facility] by going to Visits < Admission Summaries.  If you've been to the Emergency Depar

## (undated) NOTE — LETTER
ASTHMA ACTION PLAN for Rocio Adame     : 1965     Date: 2019  Provider:  Amy Corley DO  Phone for doctor or clinic: [address] [facility] [address] [phone]    ACT Score: 23      You can us on the phone and mailed copy to patient or submitted via [address]      Signatures:  Provider  Nirav Guillen DO   Patient Caretaker

## (undated) NOTE — MR AVS SNAPSHOT
[facility] [place]  [address] [address] [facility] [address]  [phone]               Thank you for choosing us for your health care visit with RUBIA Miranda. We are glad to serve you and happy to provide you with this summary of your visit.   Ple You also might have this test if you:  · Have HIV or another disease that weakens your immune system  · Use illegal drugs  · Live or work in a place with a higher rate of TB infection. This may be a care home or some nursing homes.   · Need to start a medicine If you have no reaction, the skin test is considered negative, and you are not likely to have inactive TB or TB disease. How is this test done? Your inner forearm is disinfected and a small amount of fluid called tuberculin is injected into your skin.  A Inhale 2 puffs into the lungs every 4 (four) hours as needed for Wheezing or Shortness of Breath. Commonly known as:  PROAIR HFA           ALEVE OR   Take by mouth.            Levothyroxine Sodium 100 MCG Tabs   TAKE ONE TABLET BY MOUTH EVERY DAY BEFORE B

## (undated) NOTE — LETTER
ASTHMA ACTION PLAN for Shey Bhakta     : 1965     Date: 2023  Provider:  Sepideh Campos DO  Phone for doctor or clinic: [facility], [address] [place]  [phone]    ACT Score: 19      You can use the colors of a traffic light to help learn about your asthma medicines. 1. Green - Go! % of Personal Best Peak Flow Use controller medicine. Breathing is good  No cough or wheeze  Can work and play Medicine How much to take When to take it    Budesonide-Formoterol Fumarate 160-4.5 MCG/ACT Inhalation Aerosol ig - Route: Inhale 2 puffs into the lungs 2 (two) times daily. Rinse after use - Inhalation       2. Yellow - Caution. 50-79% Personal Best Peak  Flow. Use reliever medicine to keep an asthma attack from getting bad. Cough  Wheezing  Tight Chest  Wake up at night Medicine How much to take When to take it    Albuterol inhaler - Take 2 puffs into the lungs every 4 hours as needed. Additional instructions If your symptoms do not improve or require albuterol inhaler use every 4 to 6 hours, please contact our office at [phone] and ask to speak with the nurse. 3. Red - Stop! Danger!  <50% Personal Best Peak  Flow. Take these medications until  Get help from a doctor   Medicine not helping  Breathing is hard and fast  Nose opens wide  Can't walk  Ribs show  Can't talk well Medicine How much to take When to take it    Albuterol Inhaler - Take 2 puffs every 10 minutes. If no relief after 2 treatments or symptoms are worse, please go to nearest ER or call 911 immediately. Additional Instructions If your symptoms do not improve and you cannot contact your doctor, [facility] or call [facility] immediately! [x] Asthma Action Plan reviewed with patient (and caregiver if necessary) and a copy of the plan was given to the patient/caregiver.    [] Asthma Action Plan reviewed with patient (and caregiver if necessary) on the phone and mailed copy to patient or submitted via [address]      Signatures:  Provider  Lianna Hawthorne DO   Patient Caretaker

## (undated) NOTE — LETTER
07/29/19        Ashley Dumont [address] [facility]      Dear Wil Ambriz,    [address] [facility] records indicate that you have outstanding lab work and or testing that was ordered for you and has not yet been completed:  Orders Placed This Encounter

## (undated) NOTE — LETTER
ASTHMA ACTION PLAN for Christianne Alcaraz     : 1965     Date: 10/13/2020  Provider:  Elise Fox DO  Phone for doctor or clinic: [address] [facility], [facility] [place]  [phone]    ACT Score: 18      You can u Signatures:  Provider  Ottoniel Cisneros,    Patient Caretaker

## (undated) NOTE — MR AVS SNAPSHOT
[facility] [place]  [address] [address]  [phone]               Thank you for choosing us for your health care visit with [facility] NURSE. We are glad to serve you and happy to provide you with this summary of your visit.   Please office, you can view your past visit information in [employer] by going to Visits < Visit Summaries. Plantiga questions? Call [phone] for help. Plantiga is NOT to be used for urgent needs. For medical emergencies, dial 911.            Visit [facility]

## (undated) NOTE — LETTER
ASTHMA ACTION PLAN for Mark Mcclain     : 1965     Date: 2017  Provider:  Mk Samuel DO  Phone for doctor or clinic: [facility] [facility] [address] [phone]    ACT Score: 18      You can us Provider  Gurmeetabhinav Gan, DO   Patient Caretaker

## (undated) NOTE — LETTER
ASTHMA ACTION PLAN for Matt Roche     : 1965     Date: 2022  Provider:  Sun Stewart DO  Phone for doctor or clinic: [facility] [facility]  [place]  [phone]           You can use the colors of a traffic light to help learn about your asthma medicines. 1. Green - Go! % of Personal Best Peak Flow Use controller medicine. Breathing is good  No cough or wheeze  Can work and play Medicine How much to take When to take it    Budesonide-Formoterol Fumarate 160-4.5 MCG/ACT Inhalation Aerosol  Inhale 2 puffs into the lungs 2 (two) times daily. Rinse after use      2. Yellow - Caution. 50-79% Personal Best Peak  Flow. Use reliever medicine to keep an asthma attack from getting bad. Cough  Wheezing  Tight Chest  Wake up at night Medicine How much to take When to take it    Albuterol inhaler - Take 2 puffs into the lungs every 4 hours as needed. Additional instructions If your symptoms do not improve or require albuterol inhaler use every 4 to 6 hours, please contact our office at [phone] and ask to speak with the nurse. 3. Red - Stop! Danger!  <50% Personal Best Peak  Flow. Take these medications until  Get help from a doctor   Medicine not helping  Breathing is hard and fast  Nose opens wide  Can't walk  Ribs show  Can't talk well Medicine How much to take When to take it    Albuterol Inhaler - Take 2 puffs every 10 minutes. If no relief after 2 treatments or symptoms are worse, please go to nearest ER or call 911 immediately. Additional Instructions If your symptoms do not improve and you cannot contact your doctor, [facility] or call 911 immediately! [x] Asthma Action Plan reviewed with patient (and caregiver if necessary) and a copy of the plan was given to the patient/caregiver.    [] Asthma Action Plan reviewed with patient (and caregiver if necessary) on the phone and mailed copy to patient or submitted via [employer].      Signatures:  Provider  Juanis eSvilla DO   Patient Caretaker